# Patient Record
Sex: FEMALE | Race: WHITE | NOT HISPANIC OR LATINO | Employment: OTHER | URBAN - METROPOLITAN AREA
[De-identification: names, ages, dates, MRNs, and addresses within clinical notes are randomized per-mention and may not be internally consistent; named-entity substitution may affect disease eponyms.]

---

## 2017-06-14 DIAGNOSIS — I25.10 CORONARY ARTERY DISEASE INVOLVING NATIVE CORONARY ARTERY OF NATIVE HEART WITHOUT ANGINA PECTORIS: ICD-10-CM

## 2017-06-14 DIAGNOSIS — N28.9 RENAL INSUFFICIENCY: ICD-10-CM

## 2017-06-14 DIAGNOSIS — E66.01 MORBID OBESITY, UNSPECIFIED OBESITY TYPE (H): ICD-10-CM

## 2017-06-14 DIAGNOSIS — I10 ESSENTIAL HYPERTENSION: ICD-10-CM

## 2017-06-14 DIAGNOSIS — G47.33 OSA (OBSTRUCTIVE SLEEP APNEA): ICD-10-CM

## 2017-06-14 RX ORDER — NITROGLYCERIN 0.4 MG/1
0.4 TABLET SUBLINGUAL EVERY 5 MIN PRN
Qty: 25 TABLET | Refills: 1 | Status: SHIPPED | OUTPATIENT
Start: 2017-06-14 | End: 2021-09-14

## 2017-11-26 DIAGNOSIS — E78.2 MIXED HYPERLIPIDEMIA: ICD-10-CM

## 2017-11-26 DIAGNOSIS — I10 ESSENTIAL HYPERTENSION: ICD-10-CM

## 2017-11-26 RX ORDER — CARVEDILOL 25 MG/1
25 TABLET ORAL 2 TIMES DAILY WITH MEALS
Qty: 180 TABLET | Refills: 0 | Status: SHIPPED | OUTPATIENT
Start: 2017-11-26 | End: 2018-02-21

## 2017-11-26 RX ORDER — PRAVASTATIN SODIUM 20 MG
TABLET ORAL
Qty: 90 TABLET | Refills: 0 | Status: SHIPPED | OUTPATIENT
Start: 2017-11-26 | End: 2018-02-21

## 2018-01-16 ENCOUNTER — PRE VISIT (OUTPATIENT)
Dept: CARDIOLOGY | Facility: CLINIC | Age: 83
End: 2018-01-16

## 2018-01-19 ENCOUNTER — OFFICE VISIT (OUTPATIENT)
Dept: CARDIOLOGY | Facility: CLINIC | Age: 83
End: 2018-01-19
Attending: NURSE PRACTITIONER
Payer: COMMERCIAL

## 2018-01-19 VITALS
SYSTOLIC BLOOD PRESSURE: 134 MMHG | HEART RATE: 64 BPM | BODY MASS INDEX: 44.24 KG/M2 | HEIGHT: 66 IN | WEIGHT: 275.3 LBS | DIASTOLIC BLOOD PRESSURE: 78 MMHG

## 2018-01-19 DIAGNOSIS — I25.10 CORONARY ARTERY DISEASE INVOLVING NATIVE CORONARY ARTERY OF NATIVE HEART WITHOUT ANGINA PECTORIS: ICD-10-CM

## 2018-01-19 DIAGNOSIS — R01.1 HEART MURMUR: Primary | ICD-10-CM

## 2018-01-19 DIAGNOSIS — E78.2 MIXED HYPERLIPIDEMIA: ICD-10-CM

## 2018-01-19 DIAGNOSIS — G47.33 OSA (OBSTRUCTIVE SLEEP APNEA): ICD-10-CM

## 2018-01-19 LAB
CHOLEST SERPL-MCNC: 186 MG/DL
HDLC SERPL-MCNC: 41 MG/DL
LDLC SERPL CALC-MCNC: 66 MG/DL
NONHDLC SERPL-MCNC: 145 MG/DL
TRIGL SERPL-MCNC: 397 MG/DL

## 2018-01-19 PROCEDURE — 80061 LIPID PANEL: CPT | Performed by: NURSE PRACTITIONER

## 2018-01-19 PROCEDURE — 99214 OFFICE O/P EST MOD 30 MIN: CPT | Performed by: INTERNAL MEDICINE

## 2018-01-19 PROCEDURE — 36415 COLL VENOUS BLD VENIPUNCTURE: CPT | Performed by: NURSE PRACTITIONER

## 2018-01-19 RX ORDER — FAMOTIDINE 20 MG
TABLET ORAL 2 TIMES DAILY
COMMUNITY
End: 2021-09-14

## 2018-01-19 NOTE — PROGRESS NOTES
REFERRING PHYSICIAN:  Dr. Nga Conde      HISTORY OF PRESENT ILLNESS:  Ms. Barrios is a very pleasant 83-year-old female with a history of nonobstructive coronary artery disease, hypertension, mixed hyperlipidemia, morbid obesity and sleep apnea.  She has other medical conditions including chronic renal insufficiency, neuropathy and gait imbalance as well.  In the last year, she has had, it sounds like some falls related to gait instability.  She describes 1 episode in August where she got up to use the restroom and could not move her left leg.  Her son is with her today and we did talk about this a little bit.  It sounds like she has been having trouble with moving her legs due to her neuropathy.  She had seen physical therapy about a year ago and received some benefit from that in coordinating her leg movements.  It sounds like she may have had some more frequent falling in recent months.  I have suggested that she follow up with her primary care provider on this.  One episode was a little bit concerning about possibility of a stroke because she said she could not move her left leg at all.  Eventually, she was able to pick it up off the ground a little bit, enough to use it and continue to move.  This was back in August, she states.  She has not had any recurrent left arm pain which she has had in the past.  She did have 1 episode of what she felt was heartburn that woke her up at night.  The Zantac did not really seem to help but she went and slept in a recliner and then it seemed to go away.  She is not complaining of any difficulty breathing.  She did have a cholesterol profile today which does show a little bit of deterioration in her numbers.  She is on low dose Pravachol which is helpful in reducing her LDL down to 66 but her triglycerides are actually going up a little bit to 397.  HDL is at 41, total cholesterol 186.  I did review these numbers with her today and her son.  Unfortunately, there is no real good  treatment for a low HDL and her hypertriglyceridemia is likely due to her morbid obesity.      PHYSICAL EXAMINATION:     VITALS SIGNS:  Today, her blood pressure is 134/78, pulse is 64, weight 275.  She is up 6 pounds from last year.  Body mass index is 45.     CARDIOVASCULAR:  Tones sound regular today, suggesting a normal sinus rhythm.  I do auscultate a heart murmur which I have not heard in the past in the right upper sternal border.  I do not appreciate a gallop or rub.   LUNGS:  Clear posteriorly.   EXTREMITIES:  I do not note any significant peripheral edema on exam today.      SUMMARY:  Ms. Barrios is a very pleasant 83-year-old female with a history of nonobstructive coronary disease, hypertension, obesity and mixed hyperlipidemia.  She has treated sleep apnea, renal insufficiency which is stable.  Her main health issues right now stem from her neuropathy, gait imbalance and frequent falling.  Once again, I have advised her to follow up with a primary care provider on this.  There is 1 episode that she describes that I am a little bit concerned she may have had a TIA or stroke-like symptom but she has had chronic gait imbalance and did receive some benefit from physical therapy last year.  This may be helpful to repeat for her.  I would like her to undergo an echocardiogram because of the murmur I auscultated on exam today, specifically to look for aortic valve disease.  If she has aortic stenosis, this may be contributing to her gait imbalance with lightheadedness, etc.  She does not provide a good history of her falls at times.  I will follow up with the results of the echocardiogram once it is complete.  She did not need any medication refills today.  I am happy to see her back as needed.  Please feel free to contact me with any questions you have in regards to her care.      cc:   Nga Conde MD    [address] [address]         SONIA CHAPARRO,               D: 2018 11:08   T: 2018 11:49   MT: RAQUEL      Name:     YASIR ROBLERO   MRN:      [id number]        Account:      [id number]   :      1935           Service Date: 2018      Document: [id number]

## 2018-01-19 NOTE — PROGRESS NOTES
HPI and Plan:   See dictation    Orders Placed This Encounter   Procedures     Echocardiogram       Orders Placed This Encounter   Medications     Cranberry 300 MG TABS     Sig: Take by mouth daily     Vitamin D, Cholecalciferol, 1000 UNITS CAPS     Sig: Take by mouth 2 times daily       There are no discontinued medications.      Encounter Diagnoses   Name Primary?     PHILLIP (obstructive sleep apnea)      Coronary artery disease involving native coronary artery of native heart without angina pectoris      Heart murmur Yes       CURRENT MEDICATIONS:  Current Outpatient Prescriptions   Medication Sig Dispense Refill     Cranberry 300 MG TABS Take by mouth daily       Vitamin D, Cholecalciferol, 1000 UNITS CAPS Take by mouth 2 times daily       carvedilol (COREG) 25 MG tablet Take 1 tablet (25 mg) by mouth 2 times daily (with meals) 180 tablet 0     pravastatin (PRAVACHOL) 20 MG tablet Take 1 tab by mouth daily in the evening 90 tablet 0     nitroglycerin (NITROSTAT) 0.4 MG sublingual tablet Place 1 tablet (0.4 mg) under the tongue every 5 minutes as needed for chest pain 25 tablet 1     valsartan (DIOVAN) 80 MG tablet Take 1 tablet (80 mg) by mouth daily 30 tablet 12     furosemide (LASIX) 40 MG tablet Take 1 tablet (40 mg) by mouth 2 times daily (Patient taking differently: Take 40 mg by mouth Take 80 mg in AM, take 40 mg in PM, take additional 40 mg in PM, as directed) 180 tablet 4     oxybutynin (DITROPAN-XL) 10 MG 24 hr tablet Take by mouth daily       ASPIRIN 81 MG OR TABS 1 TABLET DAILY, takes AFTERNOON       OSCAL 500/200 D-3 OR 1 TAB BID  (does take 2 tabs/day)  Pt was instructed to take at two separate times to get full benefit.       CENTRUM SILVER OR DAILY       B-12 500 MCG OR TABS DAILY         ALLERGIES     Allergies   Allergen Reactions     Neurontin [Gabapentin] Other (See Comments)     hallucinations     Aspirin      In large doses     Cipro [Ciprofloxacin]      Chest pain     Penicillins      rash      Prinivil [Lisinopril]      cough     Sulfa Drugs      shakes       PAST MEDICAL HISTORY:  Past Medical History:   Diagnosis Date     CAD (coronary artery disease)     11/17/2011 Lexiscan Nuclear study - mild anterior/apical ischemia (LAD), 11/2011 CT cor angio - nonobstructive CAD, Calcium score 0     Cancer (H)     Basal cell face     CKD (chronic kidney disease)      Frequent UTI      Hyperlipidemia      Hypertension      Neuropathy     secondary to low back DJD     Obese      PHILLIP (obstructive sleep apnea)      Osteopenia      Pyelonephritis      Thrombocytopenia (H)     ITP       PAST SURGICAL HISTORY:  Past Surgical History:   Procedure Laterality Date     APPENDECTOMY       CATARACT IOL, RT/LT  2005     CHOLECYSTECTOMY       ENT SURGERY      basal cell     ESOPHAGOSCOPY, GASTROSCOPY, DUODENOSCOPY (EGD), COMBINED N/A 10/6/2014    Procedure: COMBINED ESOPHAGOSCOPY, GASTROSCOPY, DUODENOSCOPY (EGD);  Surgeon: Rell Torres MD;  Location:  GI     GYN SURGERY      tubal ligation, hysterectomy     ORTHOPEDIC SURGERY      jaw surgery, spine stenosis - decompression L 3-5 surgery       FAMILY HISTORY:  Family History   Problem Relation Age of Onset     Breast Cancer Daughter      HEART DISEASE Sister      C.A.D. Maternal Grandfather      Cancer - colorectal Brother        SOCIAL HISTORY:  Social History     Social History     Marital status:      Spouse name: N/A     Number of children: N/A     Years of education: N/A     Social History Main Topics     Smoking status: Former Smoker     Quit date: 1/27/1970     Smokeless tobacco: Never Used     Alcohol use Yes      Comment: once per month if that     Drug use: None     Sexual activity: No     Other Topics Concern     Parent/Sibling W/ Cabg, Mi Or Angioplasty Before 65f 55m? No     Caffeine Concern No     Sleep Concern Yes     sleep apnea, wears cpap at night     Stress Concern No     Weight Concern No     Special Diet Yes     low sodium     Exercise No      "Seat Belt Yes     Social History Narrative    She lives in a condo currently in [place]               Review of Systems:  Skin:  Negative       Eyes:  Positive for glasses macular degeneration; reading glasses  ENT:  Positive for hearing loss;sinus trouble    Respiratory:  Positive for sleep apnea;CPAP;dyspnea on exertion;cough dry cough   Cardiovascular:    Positive for;lightheadedness    Gastroenterology: Positive for reflux;heartburn    Genitourinary:  Positive for nocturia    Musculoskeletal:  Positive for neck pain;back pain;arthritis;joint pain;nocturnal cramping knees  Neurologic:  Positive for numbness or tingling of hands    Psychiatric:  Negative      Heme/Lymph/Imm:  Negative      Endocrine:  Negative        Physical Exam:  Vitals: /78 (BP Location: Right arm, Patient Position: Chair, Cuff Size: Adult Large)  Pulse 64  Ht 1.664 m (5' 5.5\")  Wt 124.9 kg (275 lb 4.8 oz)  BMI 45.12 kg/m2    Constitutional:  cooperative, alert and oriented, well developed, well nourished, in no acute distress        Skin:  warm and dry to the touch          Head:  normocephalic        Eyes:  pupils equal and round        Lymph:      ENT:  no pallor or cyanosis, dentition good        Neck:  no carotid bruit delayed carotid upstroke      Respiratory:  clear to auscultation;normal symmetry         Cardiac: regular rhythm       systolic ejection murmur;RUSB          pulses below the femoral arteries are diminished                                      GI:  abdomen soft obese      Extremities and Muscular Skeletal:  no deformities, clubbing, cyanosis, erythema observed;no edema              Neurological:  affect appropriate   gait imbalance    Psych:  Alert and Oriented x 3          CC  HARPREET More CNP  [id number]  [place] [id number]                  "

## 2018-01-19 NOTE — MR AVS SNAPSHOT
After Visit Summary   1/19/2018    Karli Barrios    MRN: [id number]           Patient Information     Date Of Birth          1935        Visit Information        Provider Department      1/19/2018 10:45 AM Ann Knapp DO [facility]        Today's Diagnoses     Heart murmur    -  1    PHILLIP (obstructive sleep apnea)        Coronary artery disease involving native coronary artery of native heart without angina pectoris           Follow-ups after your visit        Your next 10 appointments already scheduled     Feb 01, 2018  1:00 PM CST   Ech Complete with [id number] ([facility])    [address]  [facility] [address] [phone]           1. Please bring or wear a comfortable two-piece outfit. 2. You may eat, drink and take your normal medicines. 3. For any questions that cannot be answered, please contact the ordering physician ***Please check-in at the [place] Registration Office located in [address] in the [facility] building. When you are finished registering, please go to [address] and have a seat. The technician will call your name for the test.              Future tests that were ordered for you today     Open Future Orders        Priority Expected Expires Ordered    Echocardiogram Routine 1/26/2018 1/19/2019 1/19/2018            Who to contact     If you have questions or need follow up information about today's clinic visit or your schedule please contact [facility] directly at [phone].  Normal or non-critical lab and imaging results will be communicated to you by MyChart, letter or phone within 4 business days after the clinic has received the results. If you do not hear from us within 7 days, please contact the clinic through MyChart or phone. If you have a critical or abnormal lab result, we  "will notify you by phone as soon as possible.  Submit refill requests through [employer] or call your pharmacy and they will forward the refill request to us. Please allow 3 business days for your refill to be completed.          Additional Information About Your Visit        [employer] Information     [employer] lets you send messages to your doctor, view your test results, renew your prescriptions, schedule appointments and more. To sign up, go to [email or web address] . Click on \"Log in\" on the left side of the screen, which will take you to the Welcome page. Then click on \"Sign up Now\" on the right side of the page.     You will be asked to enter the access code listed below, as well as some personal information. Please follow the directions to create your username and password.     Your access code is: [email or web address]  Expires: 2018 11:10 AM     Your access code will  in 90 days. If you need help or a new code, please call your [facility] clinic or [phone].        Care EveryWhere ID     This is your Care EveryWhere ID. This could be used by other organizations to access your [facility] medical records  [id number]        Your Vitals Were     Pulse Height BMI (Body Mass Index)             64 1.664 m (5' 5.5\") 45.12 kg/m2          Blood Pressure from Last 3 Encounters:   18 134/78   16 144/68   11/25/15 146/74    Weight from Last 3 Encounters:   18 124.9 kg (275 lb 4.8 oz)   16 122 kg (269 lb)   11/25/15 119.5 kg (263 lb 6.4 oz)              We Performed the Following     Follow-Up with Cardiologist          Today's Medication Changes          These changes are accurate as of: 18 11:11 AM.  If you have any questions, ask your nurse or doctor.               These medicines have changed or have updated prescriptions.        Dose/Directions    furosemide 40 MG tablet   Commonly known as:  LASIX   This may have changed:    - when to take this  - additional instructions   Used for:  " Coronary atherosclerosis of unspecified type of vessel, native or graft        Dose:  40 mg   Take 1 tablet (40 mg) by mouth 2 times daily   Quantity:  180 tablet   Refills:  4                Primary Care Provider Office Phone # Fax #    Nga Conde [phone]       [facility] [address]  [facility] [address]        Equal Access to Services     [facility]: Hadii aad ku hadasho Soomaali, waaxda luqadaha, qaybta kaalmada adeegyada, waxay idiin hayaan adeeg kharash la'aan . So [facility] [phone].    ATENCIÓN: Si habla español, tiene a garcia disposición servicios gratuitos de asistencia lingüística. Kya al [phone].    We comply with applicable federal civil rights laws and [place] laws. We do not discriminate on the basis of race, color, national origin, age, disability, sex, sexual orientation, or gender identity.            Thank you!     Thank you for choosing [facility]  for your care. Our goal is always to provide you with excellent care. Hearing back from our patients is one way we can continue to improve our services. Please take a few minutes to complete the written survey that you may receive in the mail after your visit with us. Thank you!             Your Updated Medication List - Protect others around you: Learn how to safely use, store and throw away your medicines at www.disposemymeds.org.          This list is accurate as of: 1/19/18 11:11 AM.  Always use your most recent med list.                   Brand Name Dispense Instructions for use Diagnosis    aspirin 81 MG tablet      1 TABLET DAILY, takes AFTERNOON        B-12 500 MCG Tabs      DAILY        carvedilol 25 MG tablet    COREG    180 tablet    Take 1 tablet (25 mg) by mouth 2 times daily (with meals)    Essential hypertension       CENTRUM SILVER PO      DAILY        Cranberry 300 MG Tabs      Take by mouth daily        furosemide 40 MG tablet    LASIX     180 tablet    Take 1 tablet (40 mg) by mouth 2 times daily    Coronary atherosclerosis of unspecified type of vessel, native or graft       nitroGLYcerin 0.4 MG sublingual tablet    NITROSTAT    25 tablet    Place 1 tablet (0.4 mg) under the tongue every 5 minutes as needed for chest pain    PHILLIP (obstructive sleep apnea), Coronary artery disease involving native coronary artery of native heart without angina pectoris, Essential hypertension, Renal insufficiency, Morbid obesity, unspecified obesity type (H)       OSCAL 500/200 D-3 PO      1 TAB BID  (does take 2 tabs/day)  Pt was instructed to take at two separate times to get full benefit.        oxybutynin 10 MG 24 hr tablet    DITROPAN-XL     Take by mouth daily        pravastatin 20 MG tablet    PRAVACHOL    90 tablet    Take 1 tab by mouth daily in the evening    Mixed hyperlipidemia       valsartan 80 MG tablet    DIOVAN    30 tablet    Take 1 tablet (80 mg) by mouth daily    PHILLIP (obstructive sleep apnea), Coronary artery disease involving native coronary artery of native heart without angina pectoris, Essential hypertension, Renal insufficiency, Morbid obesity, unspecified obesity type (H)       Vitamin D (Cholecalciferol) 1000 UNITS Caps      Take by mouth 2 times daily

## 2018-01-19 NOTE — LETTER
1/19/2018      Nga Conde MD, MD  [facility] [address]  [facility] [address]      RE: Karli Barrios       Dear Colleague,    I had the pleasure of seeing Karli Barrios in the [facility].    REFERRING PHYSICIAN:  Dr. Nga Conde      HISTORY OF PRESENT ILLNESS:  Ms. Barrios is a very pleasant 83-year-old female with a history of nonobstructive coronary artery disease, hypertension, mixed hyperlipidemia, morbid obesity and sleep apnea.  She has other medical conditions including chronic renal insufficiency, neuropathy and gait imbalance as well.  In the last year, she has had, it sounds like some falls related to gait instability.  She describes 1 episode in August where she got up to use the restroom and could not move her left leg.  Her son is with her today and we did talk about this a little bit.  It sounds like she has been having trouble with moving her legs due to her neuropathy.  She had seen physical therapy about a year ago and received some benefit from that in coordinating her leg movements.  It sounds like she may have had some more frequent falling in recent months.  I have suggested that she follow up with her primary care provider on this.  One episode was a little bit concerning about possibility of a stroke because she said she could not move her left leg at all.  Eventually, she was able to pick it up off the ground a little bit, enough to use it and continue to move.  This was back in August, she states.  She has not had any recurrent left arm pain which she has had in the past.  She did have 1 episode of what she felt was heartburn that woke her up at night.  The Zantac did not really seem to help but she went and slept in a recliner and then it seemed to go away.  She is not complaining of any difficulty breathing.  She did have a cholesterol profile today which does show a little bit of deterioration in her numbers.  She is on low dose  Pravachol which is helpful in reducing her LDL down to 66 but her triglycerides are actually going up a little bit to 397.  HDL is at 41, total cholesterol 186.  I did review these numbers with her today and her son.  Unfortunately, there is no real good treatment for a low HDL and her hypertriglyceridemia is likely due to her morbid obesity.      PHYSICAL EXAMINATION:     VITALS SIGNS:  Today, her blood pressure is 134/78, pulse is 64, weight 275.  She is up 6 pounds from last year.  Body mass index is 45.     CARDIOVASCULAR:  Tones sound regular today, suggesting a normal sinus rhythm.  I do auscultate a heart murmur which I have not heard in the past in the right upper sternal border.  I do not appreciate a gallop or rub.   LUNGS:  Clear posteriorly.   EXTREMITIES:  I do not note any significant peripheral edema on exam today.      SUMMARY:  Ms. Barrios is a very pleasant 83-year-old female with a history of nonobstructive coronary disease, hypertension, obesity and mixed hyperlipidemia.  She has treated sleep apnea, renal insufficiency which is stable.  Her main health issues right now stem from her neuropathy, gait imbalance and frequent falling.  Once again, I have advised her to follow up with a primary care provider on this.  There is 1 episode that she describes that I am a little bit concerned she may have had a TIA or stroke-like symptom but she has had chronic gait imbalance and did receive some benefit from physical therapy last year.  This may be helpful to repeat for her.  I would like her to undergo an echocardiogram because of the murmur I auscultated on exam today, specifically to look for aortic valve disease.  If she has aortic stenosis, this may be contributing to her gait imbalance with lightheadedness, etc.  She does not provide a good history of her falls at times.  I will follow up with the results of the echocardiogram once it is complete.  She did not need any medication refills today.  I am  happy to see her back as needed.  Please feel free to contact me with any questions you have in regards to her care.      cc:   Nga [place] [address]         ANN KNAPP DO             D: 2018 11:08   T: 2018 11:49   MT: DW      Name:     YASIR ROBLERO   MRN:      [id number]        Account:      [id number]   :      1935           Service Date: 2018      Document: [id number]         Outpatient Encounter Prescriptions as of 2018   Medication Sig Dispense Refill     Cranberry 300 MG TABS Take by mouth daily       Vitamin D, Cholecalciferol, 1000 UNITS CAPS Take by mouth 2 times daily       carvedilol (COREG) 25 MG tablet Take 1 tablet (25 mg) by mouth 2 times daily (with meals) 180 tablet 0     pravastatin (PRAVACHOL) 20 MG tablet Take 1 tab by mouth daily in the evening 90 tablet 0     nitroglycerin (NITROSTAT) 0.4 MG sublingual tablet Place 1 tablet (0.4 mg) under the tongue every 5 minutes as needed for chest pain 25 tablet 1     valsartan (DIOVAN) 80 MG tablet Take 1 tablet (80 mg) by mouth daily 30 tablet 12     furosemide (LASIX) 40 MG tablet Take 1 tablet (40 mg) by mouth 2 times daily (Patient taking differently: Take 40 mg by mouth Take 80 mg in AM, take 40 mg in PM, take additional 40 mg in PM, as directed) 180 tablet 4     oxybutynin (DITROPAN-XL) 10 MG 24 hr tablet Take by mouth daily       ASPIRIN 81 MG OR TABS 1 TABLET DAILY, takes AFTERNOON       OSCAL 500/200 D-3 OR 1 TAB BID  (does take 2 tabs/day)  Pt was instructed to take at two separate times to get full benefit.       CENTRUM SILVER OR DAILY       B-12 500 MCG OR TABS DAILY       No facility-administered encounter medications on file as of 2018.        Again, thank you for allowing me to participate in the care of your patient.      Sincerely,    Ann Knapp DO     [facility]

## 2018-02-01 ENCOUNTER — TELEPHONE (OUTPATIENT)
Dept: CARDIOLOGY | Facility: CLINIC | Age: 83
End: 2018-02-01

## 2018-02-01 ENCOUNTER — HOSPITAL ENCOUNTER (OUTPATIENT)
Dept: CARDIOLOGY | Facility: CLINIC | Age: 83
Discharge: HOME | End: 2018-02-01
Attending: INTERNAL MEDICINE | Admitting: INTERNAL MEDICINE
Payer: MEDICARE

## 2018-02-01 DIAGNOSIS — R01.1 HEART MURMUR: ICD-10-CM

## 2018-02-01 DIAGNOSIS — G47.33 OSA (OBSTRUCTIVE SLEEP APNEA): ICD-10-CM

## 2018-02-01 DIAGNOSIS — I25.10 CORONARY ARTERY DISEASE INVOLVING NATIVE CORONARY ARTERY OF NATIVE HEART WITHOUT ANGINA PECTORIS: ICD-10-CM

## 2018-02-01 PROCEDURE — 93306 TTE W/DOPPLER COMPLETE: CPT | Mod: 26 | Performed by: INTERNAL MEDICINE

## 2018-02-01 PROCEDURE — 93306 TTE W/DOPPLER COMPLETE: CPT

## 2018-02-01 NOTE — TELEPHONE ENCOUNTER
Called and spoke to patient to review echo results. Dr. Knapp ordered echo to eval for murmur, possible mild to moderate p HTN noted. Briefly reviewed results over the phone with the patient. Pt states is using CPAP regularly. Will route to Dr. Knapp for further recommendations.     Interpretation Summary     The visual ejection fraction is estimated at 60-65%.  Left ventricular systolic function is normal.  Right ventricular systolic pressure is elevated, consistent with mild to  moderate pulmonary hypertension.

## 2018-02-21 DIAGNOSIS — E78.2 MIXED HYPERLIPIDEMIA: ICD-10-CM

## 2018-02-21 DIAGNOSIS — I10 ESSENTIAL HYPERTENSION: ICD-10-CM

## 2018-02-21 RX ORDER — CARVEDILOL 25 MG/1
25 TABLET ORAL 2 TIMES DAILY WITH MEALS
Qty: 180 TABLET | Refills: 3 | Status: SHIPPED | OUTPATIENT
Start: 2018-02-21 | End: 2021-08-12 | Stop reason: ALTCHOICE

## 2018-02-21 RX ORDER — PRAVASTATIN SODIUM 20 MG
TABLET ORAL
Qty: 90 TABLET | Refills: 3 | Status: SHIPPED | OUTPATIENT
Start: 2018-02-21 | End: 2018-11-21

## 2018-09-15 ENCOUNTER — NURSE TRIAGE (OUTPATIENT)
Dept: NURSING | Facility: CLINIC | Age: 83
End: 2018-09-15

## 2018-09-16 NOTE — TELEPHONE ENCOUNTER
Reason for Disposition    Sounds like a life-threatening emergency to the triager    Additional Information    Negative: Difficult to awaken or acting confused  (e.g., disoriented, slurred speech)    Negative: [1] Weakness of the face, arm or leg on one side of the body AND [2] new onset    Negative: [1] Numbness of the face, arm or leg on one side of the body AND [2] new onset    Negative: [1] Loss of speech or garbled speech AND [2] new onset    Negative: Passed out (i.e., lost consciousness, collapsed and was not responding)    Protocols used: HEADACHE-ADULT-    Karli calls and says that she came home from [facility], yesterday. Pt. Says that her head is very noisy and hears her heartbeat, in her head. Pt. Says that when this happens, it means that her blood pressure is high. Pt. Says that she had shoulder pain and took 2 NTG., which helped the pain. Denies any chest pain. Pt. Says that she is home alone. Pt. Will call 911.

## 2018-09-20 ENCOUNTER — TRANSFERRED RECORDS (OUTPATIENT)
Dept: HEALTH INFORMATION MANAGEMENT | Facility: CLINIC | Age: 83
End: 2018-09-20

## 2018-11-21 DIAGNOSIS — E78.2 MIXED HYPERLIPIDEMIA: ICD-10-CM

## 2018-11-21 RX ORDER — PRAVASTATIN SODIUM 20 MG
TABLET ORAL
Qty: 90 TABLET | Refills: 1 | Status: SHIPPED | OUTPATIENT
Start: 2018-11-21 | End: 2023-01-01

## 2019-09-17 ENCOUNTER — TRANSFERRED RECORDS (OUTPATIENT)
Dept: PHYSICAL THERAPY | Facility: CLINIC | Age: 84
End: 2019-09-17

## 2019-09-23 ENCOUNTER — TRANSFERRED RECORDS (OUTPATIENT)
Dept: HEALTH INFORMATION MANAGEMENT | Facility: CLINIC | Age: 84
End: 2019-09-23

## 2019-12-02 ENCOUNTER — THERAPY VISIT (OUTPATIENT)
Dept: PHYSICAL THERAPY | Facility: CLINIC | Age: 84
End: 2019-12-02
Payer: MEDICARE

## 2019-12-02 DIAGNOSIS — N39.46 MIXED INCONTINENCE: Primary | ICD-10-CM

## 2019-12-02 PROCEDURE — 97163 PT EVAL HIGH COMPLEX 45 MIN: CPT | Mod: GP | Performed by: PHYSICAL THERAPIST

## 2019-12-02 PROCEDURE — 97535 SELF CARE MNGMENT TRAINING: CPT | Mod: GP | Performed by: PHYSICAL THERAPIST

## 2019-12-02 PROCEDURE — 97110 THERAPEUTIC EXERCISES: CPT | Mod: GP | Performed by: PHYSICAL THERAPIST

## 2019-12-02 NOTE — PROGRESS NOTES
"[facility] Initial Evaluation  Subjective:  Patient referred to PT on 09/18/19 by Dr. Roma Rodriguez. Previously seen in PT in 2014 for pelvic floor issues as well. Chief c/o: leakage when bends over to pull up pants after voiding, transitional movements. Feels incomplete emptying. Also with cough/sneeze. Started on oxybuytinin which has helped with not having major leakage accidents but will still occur occasionally and will \"flood with walking\". ~2x week. Up 2 x night. Uses ~ 2-3 Poise pads/day. On high dose of Lasix so in morning voids every 1.5 hours, afternoon not as often and less amount. Goal is to empty all the way and void less often, not leakage with bending over.   History of UTI/kidney infections, complete hysterectomy in her 40's with \"bladder lift\", 7 vaginal deliveries (6-8# with all babies, 1 was a lot of tearing), HBP, B LE peripheral neuropathy (difficulty walking so uses walker all the time, 2 falls this year), lumbar surgery ~3 years ago.  Lives in [facility], , does own cooking but has help with cleaning/bathing.     The history is provided by the patient. No  was used.                       Objective:    Gait:  Slowed gait with wheeled walker (due to severe neuropathy). Needed min A +2 to get onto plinth, SBA/CGA with sit to stand/help with getting pants on/off.   Gait Type:  Antalgic   Assistive Devices:  Walker                                         Pelvic Dysfunction Evaluation:    Bladder/Pelvic Problems:    Storage Problem:  Mixed incontinence  Emptying Problem:  Incomplete emptying      Diagnostic Tests:    Pelvic Exam:  Yes  UA/Urine Sample:  Yes  Post Void Residual:  Yes      Cystoscopy:  Not sure            Flexibility:    Tightness present at:Adductors      Pelvic Clock Exam:    Ischiocavernosis pain:  -  Bulbocavernosis pain:  +  Transverse Perineal:  -  Levator ANI:  ++      Reflex Testing:  normal    External Assessment:  "           Introitus:  Normal  Muscle Contraction/Perineal Mobility:  Slight lift, no urogential triangle descent  Internal Assessment:      Contraction/Grade:  Weak squeeze, 2 second hold (2)          SEMG Biofeedback:  NA (deferred due to neuropathy)                  Additional History:  Delivery History:  Vaginal delivery and tearing  Number of Pregnancies: 7  Number of Live Births: 7  Caffeine Consumption:  2/day                     General     ROS    Assessment/Plan:    Patient is a 84 year old female with pelvic complaints.    Patient has the following significant findings with corresponding treatment plan.                Diagnosis 1:  Mixed incontinence  Pain -  manual therapy, self management, education, directional preference exercise and home program  Decreased ROM/flexibility - manual therapy and therapeutic exercise  Decreased strength - therapeutic exercise and therapeutic activities  Decreased proprioception - neuro re-education and therapeutic activities  Inflammation - self management/home program  Impaired gait - gait training  Impaired muscle performance - biofeedback and neuro re-education  Decreased function - therapeutic activities  Impaired posture - neuro re-education    Therapy Evaluation Codes:   1) History comprised of:   Personal factors that impact the plan of care:      Time since onset of symptoms.    Comorbidity factors that impact the plan of care are:      High blood pressure, Menopausal, Overweight and neuropathy.     Medications impacting care: High blood pressure and bladder medication.  2) Examination of Body Systems comprised of:   Body structures and functions that impact the plan of care:      Pelvis.   Activity limitations that impact the plan of care are:      Bending, Frequency, Stress incontinence, Urgency and Urge incontinence.  3) Clinical presentation characteristics are:   Unstable/Unpredictable.  4) Decision-Making    High complexity using standardized patient  assessment instrument and/or measureable assessment of functional outcome.  Cumulative Therapy Evaluation is: High complexity.    Previous and current functional limitations:  (See Goal Flow Sheet for this information)    Short term and Long term goals: (See Goal Flow Sheet for this information)     Communication ability:  Patient appears to be able to clearly communicate and understand verbal and written communication and follow directions correctly.  Treatment Explanation - The following has been discussed with the patient:   RX ordered/plan of care  Anticipated outcomes  Possible risks and side effects  This patient would benefit from PT intervention to resume normal activities.   Rehab potential is questionable.    Frequency:  2 X a month, once daily  Duration:  for 3 months  Discharge Plan:  Achieve all LTG.  Independent in home treatment program.  Reach maximal therapeutic benefit.    Please refer to the daily flowsheet for treatment today, total treatment time and time spent performing 1:1 timed codes.

## 2019-12-02 NOTE — LETTER
"DEPARTMENT OF HEALTH AND HUMAN SERVICES  CENTERS FOR MEDICARE & MEDICAID SERVICES    PLAN/UPDATED PLAN OF PROGRESS FOR OUTPATIENT REHABILITATION    PATIENTS NAME:  Karli Barrios   : 1935  PROVIDER NUMBER:    [id number]  HICN:[id number]  PROVIDER NAME: [facility]  MEDICAL RECORD NUMBER: [id number]   START OF CARE DATE:  SOC Date: 19   TYPE:  PT  PRIMARY/TREATMENT DIAGNOSIS: (Pertinent Medical Diagnosis)  Mixed incontinence    VISITS FROM START OF CARE:  Rxs Used: 1     [facility] Initial Evaluation  Subjective:  Patient referred to PT on 19 by Dr. Roma Rodriguez. Previously seen in PT in 2014 for pelvic floor issues as well. Chief c/o: leakage when bends over to pull up pants after voiding, transitional movements. Feels incomplete emptying. Also with cough/sneeze. Started on oxybuytinin which has helped with not having major leakage accidents but will still occur occasionally and will \"flood with walking\". ~2x week. Up 2 x night. Uses ~ 2-3 Poise pads/day. On high dose of Lasix so in morning voids every 1.5 hours, afternoon not as often and less amount. Goal is to empty all the way and void less often, not leakage with bending over.   History of UTI/kidney infections, complete hysterectomy in her 40's with \"bladder lift\", 7 vaginal deliveries (6-8# with all babies, 1 was a lot of tearing), HBP, B LE peripheral neuropathy (difficulty walking so uses walker all the time, 2 falls this year), lumbar surgery ~3 years ago.  Lives in [facility], , does own cooking but has help with cleaning/bathing.     The history is provided by the patient. No  was used.                 Objective:  Gait:  Slowed gait with wheeled walker (due to severe neuropathy). Needed min A +2 to get onto plinth, SBA/CGA with sit to stand/help with getting pants on/off.   Gait Type:  Antalgic   Assistive Devices:  Walker    Pelvic Dysfunction Evaluation:    Bladder/Pelvic Problems:  "   Storage Problem:  Mixed incontinence  Emptying Problem:  Incomplete emptying      Diagnostic Tests:    Pelvic Exam:  Yes  UA/Urine Sample:  Yes  Post Void Residual:  Yes  Cystoscopy:  Not sure      Flexibility:    Tightness present at:Adductors    Pelvic Clock Exam:    Ischiocavernosis pain:  -  Bulbocavernosis pain:  +  Transverse Perineal:  -  Levator ANI:  ++  PATIENTS NAME:  Karli Barrios   : 1935    Reflex Testing:  normal    External Assessment:    Introitus:  Normal  Muscle Contraction/Perineal Mobility:  Slight lift, no urogential triangle descent    Internal Assessment:    Contraction/Grade:  Weak squeeze, 2 second hold (2)    SEMG Biofeedback:  NA (deferred due to neuropathy)    Additional History:  Delivery History:  Vaginal delivery and tearing  Number of Pregnancies: 7  Number of Live Births: 7  Caffeine Consumption:  2/day       Assessment/Plan:    Patient is a 84 year old female with pelvic complaints.    Patient has the following significant findings with corresponding treatment plan.                Diagnosis 1:  Mixed incontinence  Pain -  manual therapy, self management, education, directional preference exercise and home program  Decreased ROM/flexibility - manual therapy and therapeutic exercise  Decreased strength - therapeutic exercise and therapeutic activities  Decreased proprioception - neuro re-education and therapeutic activities  Inflammation - self management/home program  Impaired gait - gait training  Impaired muscle performance - biofeedback and neuro re-education  Decreased function - therapeutic activities  Impaired posture - neuro re-education    Therapy Evaluation Codes:   1) History comprised of:   Personal factors that impact the plan of care:      Time since onset of symptoms.    Comorbidity factors that impact the plan of care are:      High blood pressure, Menopausal, Overweight and neuropathy.     Medications impacting care: High blood pressure and bladder  "medication.  2) Examination of Body Systems comprised of:   Body structures and functions that impact the plan of care:      Pelvis.   Activity limitations that impact the plan of care are:      Bending, Frequency, Stress incontinence, Urgency and Urge incontinence.  3) Clinical presentation characteristics are:   Unstable/Unpredictable.  4) Decision-Making    High complexity using standardized patient assessment instrument and/or measureable assessment of functional outcome.  Cumulative Therapy Evaluation is: High complexity.  Previous and current functional limitations:  (See Goal Flow Sheet for this information)    Short term and Long term goals: (See Goal Flow Sheet for this information)     Communication ability:  Patient appears to be able to clearly communicate and understand verbal and written communication and follow directions correctly.  Treatment Explanation - The following has been discussed with the patient:   RX ordered/plan of care  PATIENTS NAME:  Karli Barrios   : 1935    Anticipated outcomes  Possible risks and side effects  This patient would benefit from PT intervention to resume normal activities.   Rehab potential is questionable.    Frequency:  2 X a month, once daily  Duration:  for 3 months  Discharge Plan:  Achieve all LTG.  Independent in home treatment program.  Reach maximal therapeutic benefit.      Caregiver Signature/Credentials _____________________________ Date ________       Treating Provider: Huong Luna, PT, OCS   I have reviewed and certified the need for these services and plan of treatment while under my care.        PHYSICIAN'S SIGNATURE:   _____________________________________  Date___________     Roma Rodriguez MD    Certification period:  Beginning of Cert date period: 19 to  End of Cert period date: 20     Functional Level Progress Report: Please see attached \"Goal Flow sheet for Functional level.\"    ____X____ Continue Services or       " ________ DC Services                Service dates: From  SOC Date: 12/02/19 date to present

## 2020-01-28 ENCOUNTER — THERAPY VISIT (OUTPATIENT)
Dept: PHYSICAL THERAPY | Facility: CLINIC | Age: 85
End: 2020-01-28
Payer: MEDICARE

## 2020-01-28 DIAGNOSIS — N39.46 MIXED INCONTINENCE: ICD-10-CM

## 2020-01-28 PROCEDURE — 97110 THERAPEUTIC EXERCISES: CPT | Mod: GP | Performed by: PHYSICAL THERAPIST

## 2020-01-28 PROCEDURE — 97535 SELF CARE MNGMENT TRAINING: CPT | Mod: GP | Performed by: PHYSICAL THERAPIST

## 2020-02-11 ENCOUNTER — THERAPY VISIT (OUTPATIENT)
Dept: PHYSICAL THERAPY | Facility: CLINIC | Age: 85
End: 2020-02-11
Payer: MEDICARE

## 2020-02-11 DIAGNOSIS — N39.46 MIXED INCONTINENCE: ICD-10-CM

## 2020-02-11 PROCEDURE — 97535 SELF CARE MNGMENT TRAINING: CPT | Mod: GP | Performed by: PHYSICAL THERAPIST

## 2020-02-11 PROCEDURE — 97110 THERAPEUTIC EXERCISES: CPT | Mod: GP | Performed by: PHYSICAL THERAPIST

## 2020-02-18 ENCOUNTER — THERAPY VISIT (OUTPATIENT)
Dept: PHYSICAL THERAPY | Facility: CLINIC | Age: 85
End: 2020-02-18
Payer: MEDICARE

## 2020-02-18 DIAGNOSIS — N39.46 MIXED INCONTINENCE: ICD-10-CM

## 2020-02-18 PROCEDURE — 97110 THERAPEUTIC EXERCISES: CPT | Mod: GP | Performed by: PHYSICAL THERAPIST

## 2020-02-18 PROCEDURE — 97535 SELF CARE MNGMENT TRAINING: CPT | Mod: GP | Performed by: PHYSICAL THERAPIST

## 2021-02-22 NOTE — PROGRESS NOTES
Subjective:  HPI  Physical Exam                    Objective:  System    Physical Exam    General     ROS    Assessment/Plan:    DISCHARGE REPORT    Progress reporting period is from 02/18/20.       SUBJECTIVE  Subjective changes noted by patient:   Very difficult for patient to get here due to leg pain/walking into clinic/etc. Reports no gushers or major leakage. Only had to change pad 1-2/day. Leakage occurs only if moving. No leakage with getting up from sitting.     Current pain level is NA  .     Previous pain level was  NA  .   Changes in function:  Yes (See Goal flowsheet attached for changes in current functional level)  Adverse reaction to treatment or activity: None    OBJECTIVE  Changes noted in objective findings:  Patient has failed to return to therapy so current objective findings are unknown.  Objective: Recommend f/u in 1 month as patient is improving and can work on HEP. Difficulty getting TB around knees for roll out's.      ASSESSMENT/PLAN  Updated problem list and treatment plan:   STG/LTGs have been met or progress has been made towards goals:  Yes (See Goal flow sheet completed today.)  Assessment of Progress: The patient has not returned to therapy. Current status is unknown.  Self Management Plans:  Patient has been instructed in a home treatment program.  Patient  has been instructed in self management of symptoms.    Karli continues to require the following intervention to meet STG and LTG's:  PT intervention is no longer required to meet STG/LTG.    Recommendations:  This patient is ready to be discharged from therapy and continue their home treatment program.    Please refer to the daily flowsheet for treatment today, total treatment time and time spent performing 1:1 timed codes.

## 2021-06-03 ENCOUNTER — OFFICE VISIT (OUTPATIENT)
Dept: CARDIOLOGY | Facility: CLINIC | Age: 86
End: 2021-06-03
Payer: COMMERCIAL

## 2021-06-03 VITALS
OXYGEN SATURATION: 94 % | SYSTOLIC BLOOD PRESSURE: 150 MMHG | DIASTOLIC BLOOD PRESSURE: 76 MMHG | HEIGHT: 64 IN | HEART RATE: 66 BPM | BODY MASS INDEX: 34.15 KG/M2 | WEIGHT: 200 LBS

## 2021-06-03 DIAGNOSIS — R00.2 PALPITATIONS: ICD-10-CM

## 2021-06-03 DIAGNOSIS — I25.10 CORONARY ARTERY DISEASE INVOLVING NATIVE CORONARY ARTERY OF NATIVE HEART WITHOUT ANGINA PECTORIS: ICD-10-CM

## 2021-06-03 DIAGNOSIS — R01.1 HEART MURMUR: Primary | ICD-10-CM

## 2021-06-03 DIAGNOSIS — E66.01 MORBID OBESITY (H): ICD-10-CM

## 2021-06-03 DIAGNOSIS — I10 ESSENTIAL HYPERTENSION: ICD-10-CM

## 2021-06-03 DIAGNOSIS — R00.0 TACHYCARDIA: ICD-10-CM

## 2021-06-03 PROCEDURE — 93000 ELECTROCARDIOGRAM COMPLETE: CPT | Performed by: INTERNAL MEDICINE

## 2021-06-03 PROCEDURE — 99204 OFFICE O/P NEW MOD 45 MIN: CPT | Performed by: INTERNAL MEDICINE

## 2021-06-03 RX ORDER — HYDRALAZINE HYDROCHLORIDE 25 MG/1
TABLET, FILM COATED ORAL
COMMUNITY
Start: 2021-05-09 | End: 2021-09-14

## 2021-06-03 ASSESSMENT — MIFFLIN-ST. JEOR: SCORE: 1342.19

## 2021-06-03 NOTE — LETTER
6/3/2021    Nga Conde MD, MD  [facility] [address]  [facility] [address]    RE: Karli Barrios       Dear Colleague,    I had the pleasure of seeing Karli Barrios in the [facility].    HPI and Plan:   See dictation    Orders Placed This Encounter   Procedures     Follow-Up with Cardiac Advanced Practice Provider     EKG 12-lead complete w/read - Clinics (performed today)     Leadless EKG Monitor 8 to 14 Days       Orders Placed This Encounter   Medications     hydrALAZINE (APRESOLINE) 25 MG tablet     Sig: TAKE ONE TABLET BY MOUTH FOUR TIMES DAILY       Medications Discontinued During This Encounter   Medication Reason     OSCAL 500/200 D-3 OR Stopped by Patient     oxybutynin (DITROPAN-XL) 10 MG 24 hr tablet Stopped by Patient     valsartan (DIOVAN) 80 MG tablet Stopped by Patient         Encounter Diagnoses   Name Primary?     Heart murmur Yes     Tachycardia      Palpitations      Essential hypertension      Coronary artery disease involving native coronary artery of native heart without angina pectoris      Morbid obesity (H)        CURRENT MEDICATIONS:  Current Outpatient Medications   Medication Sig Dispense Refill     ASPIRIN 81 MG OR TABS 1 TABLET DAILY, takes AFTERNOON       B-12 500 MCG OR TABS DAILY       carvedilol (COREG) 25 MG tablet Take 1 tablet (25 mg) by mouth 2 times daily (with meals) 180 tablet 3     CENTRUM SILVER OR DAILY       Cranberry 300 MG TABS Take by mouth daily       furosemide (LASIX) 40 MG tablet Take 1 tablet (40 mg) by mouth 2 times daily (Patient taking differently: Take 40 mg by mouth Take 80 mg in AM, take 40 mg in PM, take additional 40 mg in PM, as directed) 180 tablet 4     hydrALAZINE (APRESOLINE) 25 MG tablet TAKE ONE TABLET BY MOUTH FOUR TIMES DAILY       pravastatin (PRAVACHOL) 20 MG tablet Take 1 tab by mouth daily in the evening 90 tablet 1     Vitamin D, Cholecalciferol, 1000  "UNITS CAPS Take by mouth 2 times daily       nitroglycerin (NITROSTAT) 0.4 MG sublingual tablet Place 1 tablet (0.4 mg) under the tongue every 5 minutes as needed for chest pain (Patient not taking: Reported on 6/3/2021) 25 tablet 1       ALLERGIES     Allergies   Allergen Reactions     Neurontin [Gabapentin] Other (See Comments)     hallucinations     Aspirin      In large doses     Azithromycin Other (See Comments)     Chest pain     Cipro [Ciprofloxacin]      Chest pain     Irbesartan Unknown     Morphine Visual Disturbance     Pt. Stated \"i do not like  Morphine. It makes me feel goofy.\"     Pcn [Penicillins]      rash     Prinivil [Lisinopril]      cough     Sulfa Drugs Other (See Comments)     shakes  shakes       PAST MEDICAL HISTORY:  Past Medical History:   Diagnosis Date     CAD (coronary artery disease)     11/17/2011 Lexiscan Nuclear study - mild anterior/apical ischemia (LAD), 11/2011 CT cor angio - nonobstructive CAD, Calcium score 0     Cancer (H)     Basal cell face     CKD (chronic kidney disease)      Frequent UTI      Hyperlipidemia      Hypertension      Neuropathy     secondary to low back DJD     Obese      PHILLIP (obstructive sleep apnea)      Osteopenia      Pyelonephritis      Thrombocytopenia (H)     ITP       PAST SURGICAL HISTORY:  Past Surgical History:   Procedure Laterality Date     APPENDECTOMY       CATARACT IOL, RT/LT  2005     CHOLECYSTECTOMY       ENT SURGERY      basal cell     ESOPHAGOSCOPY, GASTROSCOPY, DUODENOSCOPY (EGD), COMBINED N/A 10/6/2014    Procedure: COMBINED ESOPHAGOSCOPY, GASTROSCOPY, DUODENOSCOPY (EGD);  Surgeon: Rell Torres MD;  Location:  GI     GYN SURGERY      tubal ligation, hysterectomy     ORTHOPEDIC SURGERY      jaw surgery, spine stenosis - decompression L 3-5 surgery       FAMILY HISTORY:  Family History   Problem Relation Age of Onset     Breast Cancer Daughter      Heart Disease Sister      C.A.D. Maternal Grandfather      Cancer - colorectal " Brother        SOCIAL HISTORY:  Social History     Socioeconomic History     Marital status:      Spouse name: None     Number of children: None     Years of education: None     Highest education level: None   Occupational History     None   Social Needs     Financial resource strain: None     Food insecurity     Worry: None     Inability: None     Transportation needs     Medical: None     Non-medical: None   Tobacco Use     Smoking status: Former Smoker     Quit date: 1970     Years since quittin.3     Smokeless tobacco: Never Used   Substance and Sexual Activity     Alcohol use: Yes     Comment: once per month if that     Drug use: None     Sexual activity: Never   Lifestyle     Physical activity     Days per week: None     Minutes per session: None     Stress: None   Relationships     Social connections     Talks on phone: None     Gets together: None     Attends Sikh service: None     Active member of club or organization: None     Attends meetings of clubs or organizations: None     Relationship status: None     Intimate partner violence     Fear of current or ex partner: None     Emotionally abused: None     Physically abused: None     Forced sexual activity: None   Other Topics Concern     Parent/sibling w/ CABG, MI or angioplasty before 65F 55M? No      Service Not Asked     Blood Transfusions Not Asked     Caffeine Concern No     Occupational Exposure Not Asked     Hobby Hazards Not Asked     Sleep Concern Yes     Comment: sleep apnea, wears cpap at night     Stress Concern No     Weight Concern No     Special Diet Yes     Comment: low sodium     Back Care Not Asked     Exercise No     Bike Helmet Not Asked     Seat Belt Yes     Self-Exams Not Asked   Social History Narrative    She lives in a condo currently in [place]               Review of Systems:  Skin:  Negative       Eyes:  Positive for glasses macular degeneration; reading glasses  ENT:  Positive for hearing  "loss;sinus trouble    Respiratory:  Positive for sleep apnea;CPAP;dyspnea on exertion;cough dry cough   Cardiovascular:  Negative;chest pain;cyanosis;syncope or near-syncope Positive for;lightheadedness;dizziness;edema pain in left arm  chest discomfort last week for 10 minutes  Gastroenterology: Positive for reflux;heartburn    Genitourinary:  Positive for nocturia    Musculoskeletal:  Positive for neck pain;back pain;arthritis;joint pain;nocturnal cramping knees  Neurologic:  Positive for numbness or tingling of hands neuropathy  Psychiatric:  Negative sleep disturbances    Heme/Lymph/Imm:  Negative weight loss    Endocrine:  Negative        Physical Exam:  Vitals: BP (!) 150/76 (BP Location: Right arm, Patient Position: Sitting, Cuff Size: Adult Regular)   Pulse 66   Ht 1.626 m (5' 4\")   Wt 90.7 kg (200 lb)   SpO2 94%   BMI 34.33 kg/m      Constitutional:  cooperative;in no acute distress morbidly obese      Skin:  warm and dry to the touch          Head:  normocephalic        Eyes:           Lymph:      ENT:  no pallor or cyanosis, dentition good        Neck:  no carotid bruit        Respiratory:  clear to auscultation;normal symmetry         Cardiac: regular rhythm                  pulses below the femoral arteries are diminished                                      GI:  abdomen soft obese      Extremities and Muscular Skeletal:  no deformities, clubbing, cyanosis, erythema observed;no edema              Neurological:  affect appropriate   gait imbalance    Psych:  Alert and Oriented x 3      Thank you for allowing me to participate in the care of your patient.      Sincerely,     Ann Knapp DO     [facility]    cc:   No referring provider defined for this encounter.        "

## 2021-06-03 NOTE — PROGRESS NOTES
Service Date: 06/03/2021    REFERRING PROVIDER:  Nga Conde MD    SUBJECTIVE:  Ms. Barrios is a pleasant 86-year-old female with a history of nonobstructive coronary artery disease, hypertension, mixed hyperlipidemia, morbid obesity, sleep apnea, chronic kidney disease and neuropathy with frequent falling.  I have not seen her since 2018.  She came in today with complaints about her heart rate.  She is having episodes where her heart races really fast.  It can happen at any time.  She also notes that her blood pressure has been elevated.  She has had a recent increase in one of her medications, hydralazine, from 3 times a day to 4 times, but it continues to be elevated.  Back when I was seeing her, she had been on valsartan and well-controlled on that medication, but this was removed from the market and she did not tolerate losartan apparently.  I asked her about amlodipine.  She does not recall that medicine and it is not on her allergy list, which is pretty extensive.  That may be another possible option for her for elevated blood pressures as well.  She does have treated sleep apnea and again her blood pressures are not well-controlled.  I am concerned about the possibility of arrhythmia, given her symptoms.  We talked a little bit about this today.  We did perform an electrocardiogram in office, which shows a normal sinus rhythm with a right bundle branch block.  This is new from her previous in 2018.  There are no acute ST or T-wave abnormalities.  There is some baseline artifact from muscle tremor, likely.    PHYSICAL EXAMINATION:  Today, her blood pressure is 150/76 and a pulse of 66.  Carotid upstrokes were brisk without bruit.  Cardiovascular tones were regular today.  I did not appreciate a murmur, gallop or rub.  Lungs are clear posteriorly.    In summary, Ms. Barrios is a very pleasant 86-year-old female with symptoms of palpitations and racing heart.  She has underlying history of nonobstructive coronary  artery disease, hypertension, mixed hyperlipidemia, obesity and sleep apnea.  Other morbidities include chronic kidney disease and neuropathy with frequent falling.  She has elevated blood pressures today, which have not been well-controlled despite increases in hydralazine.  1.  I would like her to wear a Zio Patch monitor to assess for arrhythmias, given her symptoms of palpitations and a racing heart.  Most concerningly would be atrial fibrillation.  She is at risk for this given her morbid obesity, hypertension, and sleep apnea as risk factors, as well as age.  She is agreeable to wearing this and I will order this today as well as a followup visit.  I did not make any recommendations for medication changes for her blood pressure and the reason why is she has a list of allergies and I suspect she may have been on amlodipine in the past, even though she does not recognize this name.  This may be a good option for her if her blood pressures continue to be elevated if she has not tried that.  I will leave that to the discretion of her primary doctor, Dr. Conde.  We will see her back in clinic with the results of her Zio Patch monitor and any further management needed.    Please feel free to contact me with any questions you have in regards to her care.    cc:  Nga Conde MD  [facility]  [address] [place] [address]    Ann Knapp DO        D: 2021   T: 2021   MT: al    Name:     YASIR ROBLERO  MRN:      [id number]        Account:      [id number]   :      1935           Service Date: 2021       Document: [id number]

## 2021-06-03 NOTE — PROGRESS NOTES
HPI and Plan:   See dictation    Orders Placed This Encounter   Procedures     Follow-Up with Cardiac Advanced Practice Provider     EKG 12-lead complete w/read - Clinics (performed today)     Leadless EKG Monitor 8 to 14 Days       Orders Placed This Encounter   Medications     hydrALAZINE (APRESOLINE) 25 MG tablet     Sig: TAKE ONE TABLET BY MOUTH FOUR TIMES DAILY       Medications Discontinued During This Encounter   Medication Reason     OSCAL 500/200 D-3 OR Stopped by Patient     oxybutynin (DITROPAN-XL) 10 MG 24 hr tablet Stopped by Patient     valsartan (DIOVAN) 80 MG tablet Stopped by Patient         Encounter Diagnoses   Name Primary?     Heart murmur Yes     Tachycardia      Palpitations      Essential hypertension      Coronary artery disease involving native coronary artery of native heart without angina pectoris      Morbid obesity (H)        CURRENT MEDICATIONS:  Current Outpatient Medications   Medication Sig Dispense Refill     ASPIRIN 81 MG OR TABS 1 TABLET DAILY, takes AFTERNOON       B-12 500 MCG OR TABS DAILY       carvedilol (COREG) 25 MG tablet Take 1 tablet (25 mg) by mouth 2 times daily (with meals) 180 tablet 3     CENTRUM SILVER OR DAILY       Cranberry 300 MG TABS Take by mouth daily       furosemide (LASIX) 40 MG tablet Take 1 tablet (40 mg) by mouth 2 times daily (Patient taking differently: Take 40 mg by mouth Take 80 mg in AM, take 40 mg in PM, take additional 40 mg in PM, as directed) 180 tablet 4     hydrALAZINE (APRESOLINE) 25 MG tablet TAKE ONE TABLET BY MOUTH FOUR TIMES DAILY       pravastatin (PRAVACHOL) 20 MG tablet Take 1 tab by mouth daily in the evening 90 tablet 1     Vitamin D, Cholecalciferol, 1000 UNITS CAPS Take by mouth 2 times daily       nitroglycerin (NITROSTAT) 0.4 MG sublingual tablet Place 1 tablet (0.4 mg) under the tongue every 5 minutes as needed for chest pain (Patient not taking: Reported on 6/3/2021) 25 tablet 1       ALLERGIES     Allergies   Allergen  "Reactions     Neurontin [Gabapentin] Other (See Comments)     hallucinations     Aspirin      In large doses     Azithromycin Other (See Comments)     Chest pain     Cipro [Ciprofloxacin]      Chest pain     Irbesartan Unknown     Morphine Visual Disturbance     Pt. Stated \"i do not like  Morphine. It makes me feel goofy.\"     Pcn [Penicillins]      rash     Prinivil [Lisinopril]      cough     Sulfa Drugs Other (See Comments)     shakes  shakes       PAST MEDICAL HISTORY:  Past Medical History:   Diagnosis Date     CAD (coronary artery disease)     11/17/2011 Lexiscan Nuclear study - mild anterior/apical ischemia (LAD), 11/2011 CT cor angio - nonobstructive CAD, Calcium score 0     Cancer (H)     Basal cell face     CKD (chronic kidney disease)      Frequent UTI      Hyperlipidemia      Hypertension      Neuropathy     secondary to low back DJD     Obese      PHILLIP (obstructive sleep apnea)      Osteopenia      Pyelonephritis      Thrombocytopenia (H)     ITP       PAST SURGICAL HISTORY:  Past Surgical History:   Procedure Laterality Date     APPENDECTOMY       CATARACT IOL, RT/LT  2005     CHOLECYSTECTOMY       ENT SURGERY      basal cell     ESOPHAGOSCOPY, GASTROSCOPY, DUODENOSCOPY (EGD), COMBINED N/A 10/6/2014    Procedure: COMBINED ESOPHAGOSCOPY, GASTROSCOPY, DUODENOSCOPY (EGD);  Surgeon: Rell Torres MD;  Location:  GI     GYN SURGERY      tubal ligation, hysterectomy     ORTHOPEDIC SURGERY      jaw surgery, spine stenosis - decompression L 3-5 surgery       FAMILY HISTORY:  Family History   Problem Relation Age of Onset     Breast Cancer Daughter      Heart Disease Sister      C.A.D. Maternal Grandfather      Cancer - colorectal Brother        SOCIAL HISTORY:  Social History     Socioeconomic History     Marital status:      Spouse name: None     Number of children: None     Years of education: None     Highest education level: None   Occupational History     None   Social Needs     Financial " resource strain: None     Food insecurity     Worry: None     Inability: None     Transportation needs     Medical: None     Non-medical: None   Tobacco Use     Smoking status: Former Smoker     Quit date: 1970     Years since quittin.3     Smokeless tobacco: Never Used   Substance and Sexual Activity     Alcohol use: Yes     Comment: once per month if that     Drug use: None     Sexual activity: Never   Lifestyle     Physical activity     Days per week: None     Minutes per session: None     Stress: None   Relationships     Social connections     Talks on phone: None     Gets together: None     Attends Religion service: None     Active member of club or organization: None     Attends meetings of clubs or organizations: None     Relationship status: None     Intimate partner violence     Fear of current or ex partner: None     Emotionally abused: None     Physically abused: None     Forced sexual activity: None   Other Topics Concern     Parent/sibling w/ CABG, MI or angioplasty before 65F 55M? No      Service Not Asked     Blood Transfusions Not Asked     Caffeine Concern No     Occupational Exposure Not Asked     Hobby Hazards Not Asked     Sleep Concern Yes     Comment: sleep apnea, wears cpap at night     Stress Concern No     Weight Concern No     Special Diet Yes     Comment: low sodium     Back Care Not Asked     Exercise No     Bike Helmet Not Asked     Seat Belt Yes     Self-Exams Not Asked   Social History Narrative    She lives in a condo currently in [place]               Review of Systems:  Skin:  Negative       Eyes:  Positive for glasses macular degeneration; reading glasses  ENT:  Positive for hearing loss;sinus trouble    Respiratory:  Positive for sleep apnea;CPAP;dyspnea on exertion;cough dry cough   Cardiovascular:  Negative;chest pain;cyanosis;syncope or near-syncope Positive for;lightheadedness;dizziness;edema pain in left arm  chest discomfort last week for 10  "minutes  Gastroenterology: Positive for reflux;heartburn    Genitourinary:  Positive for nocturia    Musculoskeletal:  Positive for neck pain;back pain;arthritis;joint pain;nocturnal cramping knees  Neurologic:  Positive for numbness or tingling of hands neuropathy  Psychiatric:  Negative sleep disturbances    Heme/Lymph/Imm:  Negative weight loss    Endocrine:  Negative        Physical Exam:  Vitals: BP (!) 150/76 (BP Location: Right arm, Patient Position: Sitting, Cuff Size: Adult Regular)   Pulse 66   Ht 1.626 m (5' 4\")   Wt 90.7 kg (200 lb)   SpO2 94%   BMI 34.33 kg/m      Constitutional:  cooperative;in no acute distress morbidly obese      Skin:  warm and dry to the touch          Head:  normocephalic        Eyes:           Lymph:      ENT:  no pallor or cyanosis, dentition good        Neck:  no carotid bruit        Respiratory:  clear to auscultation;normal symmetry         Cardiac: regular rhythm                  pulses below the femoral arteries are diminished                                      GI:  abdomen soft obese      Extremities and Muscular Skeletal:  no deformities, clubbing, cyanosis, erythema observed;no edema              Neurological:  affect appropriate   gait imbalance    Psych:  Alert and Oriented x 3          CC  No referring provider defined for this encounter.                  "

## 2021-06-10 ENCOUNTER — HOSPITAL ENCOUNTER (OUTPATIENT)
Dept: CARDIOLOGY | Facility: CLINIC | Age: 86
Discharge: HOME | End: 2021-06-10
Attending: INTERNAL MEDICINE | Admitting: INTERNAL MEDICINE
Payer: COMMERCIAL

## 2021-06-10 DIAGNOSIS — R00.2 PALPITATIONS: ICD-10-CM

## 2021-06-10 DIAGNOSIS — R00.0 TACHYCARDIA: ICD-10-CM

## 2021-06-10 PROCEDURE — 93246 EXT ECG>7D<15D RECORDING: CPT

## 2021-06-10 PROCEDURE — 93248 EXT ECG>7D<15D REV&INTERPJ: CPT | Performed by: INTERNAL MEDICINE

## 2021-08-02 NOTE — PROGRESS NOTES
HISTORY OF PRESENT ILLNESS:    This is a 86 year old female who follows with Dr. Knapp at [facility]   Her past medical history includes:  Hypertension, coronary artery disease, sleep apnea, mixed hyperlipidemia, CKD, morbid obesity, and peripheral neuropathy with frequent falling.    Ms Barrios has a long history of treated hypertension mainly managed by her PMD    CT coronary angiogram (2011) showed nonobstructive coronary artery disease.     ECHO (2018) showed LVEF 60-65%, grade I diastolic dysfunction, normal RV function, 1-2+ TR, RVSP 34 mmHg    She was seen in the ED for significant hypertension in April and her hydralazine was increased to 4 times/day.  She was then seen by Dr. Knapp (6/2021) with complaints of episodic prolonged palpitations.  EKG in the office showed SR with a new right bundle branch block.  She was also hypertensive.  A ZioPatch monitor was ordered    Our visit today is for further review.    Ms Barrios comes in today with her family member who assists with her history and symptoms.  She denies any recent chest pain.  She admits to being inactive due to her peripheral neuropathy.  She states that her legs have been swollen for many years.  They improve some by morning, but have been fairly consistently swollen.  She denies any shortness of breath.  She has not had any further prolonged palpitations and just describes brief heart pounding.  She checks her BP at home and many times notes SBP up to 160 mmHg in the evening.        I reviewed her 14-day ZioPatch results (6/10/21)  This showed SR with BBB  Her average HR was 68 bpm  She did have 21 SVT episodes up to 14 seconds.  2.2% PAC burden and < 1% PVC burden      VITAL SIGNS:  BP: 150/72  Pulse: 63  Weight: patient reported 250 lbs (BMI: 41)    IMPRESSION AND PLAN:    Palpitations  -ZioPatch showed SR with paroxysmal supraventricular tachycardia  No A-fib  -continue Coreg    Hypertension:  -on Coreg 25mg BID, Lasix 120 mg,  Hydralazine 25 mg QID  -BP elevated  -will add Amlodipine 2.5 mg every evening  -return in 6 wks.    Chronic Lymphedema  -encouraged low salt diet and leg elevation  -placed order for lymphedema clinic    Sleep Apnea  -uses CPAP  -getting her mask analyzed later this month    Hyperlipidemia:  -on Pravastatin 20 mg  -due for lipid panel    Mild, nonobstructive CAD per CTA (2011)            Orders Placed This Encounter   Procedures     LYMPHEDEMA THERAPY REFERRAL     Follow-Up with Cardiac Advanced Practice Provider       Orders Placed This Encounter   Medications     acetaminophen (TYLENOL) 500 MG tablet     Sig: Take 500-1,000 mg by mouth every 6 hours as needed for mild pain     amLODIPine (NORVASC) 2.5 MG tablet     Sig: Take 1 tablet (2.5 mg) by mouth daily     Dispense:  90 tablet     Refill:  3       There are no discontinued medications.      Encounter Diagnoses   Name Primary?     Heart murmur      Tachycardia      Palpitations      Essential hypertension      Coronary artery disease involving native coronary artery of native heart without angina pectoris      Morbid obesity (H)      Lymphedema Yes       CURRENT MEDICATIONS:  Current Outpatient Medications   Medication Sig Dispense Refill     acetaminophen (TYLENOL) 500 MG tablet Take 500-1,000 mg by mouth every 6 hours as needed for mild pain       amLODIPine (NORVASC) 2.5 MG tablet Take 1 tablet (2.5 mg) by mouth daily 90 tablet 3     ASPIRIN 81 MG OR TABS 1 TABLET DAILY, takes AFTERNOON       B-12 500 MCG OR TABS DAILY       carvedilol (COREG) 25 MG tablet Take 1 tablet (25 mg) by mouth 2 times daily (with meals) 180 tablet 3     CENTRUM SILVER OR DAILY       Cranberry 300 MG TABS Take by mouth daily       furosemide (LASIX) 40 MG tablet Take 1 tablet (40 mg) by mouth 2 times daily (Patient taking differently: Take 40 mg by mouth Take 80 mg in AM, take 40 mg in PM, take additional 40 mg in PM, as directed) 180 tablet 4     hydrALAZINE (APRESOLINE) 25 MG  "tablet TAKE ONE TABLET BY MOUTH FOUR TIMES DAILY       pravastatin (PRAVACHOL) 20 MG tablet Take 1 tab by mouth daily in the evening 90 tablet 1     Vitamin D, Cholecalciferol, 1000 UNITS CAPS Take by mouth 2 times daily       nitroglycerin (NITROSTAT) 0.4 MG sublingual tablet Place 1 tablet (0.4 mg) under the tongue every 5 minutes as needed for chest pain (Patient not taking: Reported on 6/3/2021) 25 tablet 1       ALLERGIES     Allergies   Allergen Reactions     Neurontin [Gabapentin] Other (See Comments)     hallucinations     Aspirin      In large doses     Azithromycin Other (See Comments)     Chest pain     Cipro [Ciprofloxacin]      Chest pain     Irbesartan Unknown     Morphine Visual Disturbance     Pt. Stated \"i do not like  Morphine. It makes me feel goofy.\"     Pcn [Penicillins]      rash     Prinivil [Lisinopril]      cough     Sulfa Drugs Other (See Comments)     shakes  shakes       PAST MEDICAL HISTORY:  Past Medical History:   Diagnosis Date     CAD (coronary artery disease)     11/17/2011 Lexiscan Nuclear study - mild anterior/apical ischemia (LAD), 11/2011 CT cor angio - nonobstructive CAD, Calcium score 0     Cancer (H)     Basal cell face     CKD (chronic kidney disease)      Frequent UTI      Hyperlipidemia      Hypertension      Neuropathy     secondary to low back DJD     Obese      PHILLIP (obstructive sleep apnea)      Osteopenia      Pyelonephritis      Thrombocytopenia (H)     ITP       PAST SURGICAL HISTORY:  Past Surgical History:   Procedure Laterality Date     APPENDECTOMY       CATARACT IOL, RT/LT  2005     CHOLECYSTECTOMY       ENT SURGERY      basal cell     ESOPHAGOSCOPY, GASTROSCOPY, DUODENOSCOPY (EGD), COMBINED N/A 10/6/2014    Procedure: COMBINED ESOPHAGOSCOPY, GASTROSCOPY, DUODENOSCOPY (EGD);  Surgeon: Rell Torres MD;  Location:  GI     GYN SURGERY      tubal ligation, hysterectomy     ORTHOPEDIC SURGERY      jaw surgery, spine stenosis - decompression L 3-5 surgery "       FAMILY HISTORY:  Family History   Problem Relation Age of Onset     Breast Cancer Daughter      Heart Disease Sister      RHONDA. Maternal Grandfather      Cancer - colorectal Brother        SOCIAL HISTORY:  Social History     Socioeconomic History     Marital status:      Spouse name: None     Number of children: None     Years of education: None     Highest education level: None   Occupational History     None   Tobacco Use     Smoking status: Former Smoker     Quit date: 1970     Years since quittin.5     Smokeless tobacco: Never Used   Substance and Sexual Activity     Alcohol use: Not Currently     Comment: once per month if that     Drug use: None     Sexual activity: Never   Other Topics Concern     Parent/sibling w/ CABG, MI or angioplasty before 65F 55M? No      Service Not Asked     Blood Transfusions Not Asked     Caffeine Concern No     Occupational Exposure Not Asked     Hobby Hazards Not Asked     Sleep Concern Yes     Comment: sleep apnea, wears cpap at night     Stress Concern No     Weight Concern No     Special Diet Yes     Comment: low sodium     Back Care Not Asked     Exercise No     Bike Helmet Not Asked     Seat Belt Yes     Self-Exams Not Asked   Social History Narrative    She lives in a [facility] currently in [place]             Social Determinants of Health     Financial Resource Strain:      Difficulty of Paying Living Expenses:    Food Insecurity:      Worried About Running Out of Food in the Last Year:      Ran Out of Food in the Last Year:    Transportation Needs:      Lack of Transportation (Medical):      Lack of Transportation (Non-Medical):    Physical Activity:      Days of Exercise per Week:      Minutes of Exercise per Session:    Stress:      Feeling of Stress :    Social Connections:      Frequency of Communication with Friends and Family:      Frequency of Social Gatherings with Friends and Family:      Attends [employer] Services:      Active  "Member of Clubs or Organizations:      Attends Club or Organization Meetings:      Marital Status:    Intimate Partner Violence:      Fear of Current or Ex-Partner:      Emotionally Abused:      Physically Abused:      Sexually Abused:        Review of Systems:  Skin:  Negative       Eyes:  Positive for glasses macular degeneration; reading glasses  ENT:  Positive for hearing loss;sinus trouble    Respiratory:  Positive for sleep apnea;CPAP;dyspnea on exertion;cough dry cough   Cardiovascular:  Negative;chest pain;cyanosis;syncope or near-syncope Positive for;lightheadedness;dizziness;edema;fatigue Fast HR  Gastroenterology: Positive for reflux;heartburn    Genitourinary:  Positive for nocturia    Musculoskeletal:  Positive for neck pain;back pain;arthritis;joint pain;nocturnal cramping knees  Neurologic:  Positive for numbness or tingling of hands;headaches neuropathy  Psychiatric:  Negative      Heme/Lymph/Imm:  Negative weight loss    Endocrine:  Negative        Physical Exam:  Vitals: BP (!) 150/72 (BP Location: Right arm, Patient Position: Sitting, Cuff Size: Adult Regular)   Pulse 63   Ht 1.651 m (5' 5\")   Wt 113.4 kg (250 lb)   SpO2 96%   BMI 41.60 kg/m      Constitutional:  cooperative;in no acute distress morbidly obese      Skin:  warm and dry to the touch          Head:  normocephalic        Eyes:           Lymph:      ENT:  no pallor or cyanosis        Neck:  no carotid bruit        Respiratory:  clear to auscultation;normal symmetry         Cardiac: regular rhythm     no presence of murmur            pulses below the femoral arteries are diminished                                      GI:  abdomen soft obese      Extremities and Muscular Skeletal:      bilateral LE edema;2+;1+          Neurological:  affect appropriate   gait imbalance    Psych:  Alert and Oriented x 3          CC  Ann Knapp DO  [id number]  [place] [id number]                  "

## 2021-08-03 ENCOUNTER — OFFICE VISIT (OUTPATIENT)
Dept: CARDIOLOGY | Facility: CLINIC | Age: 86
End: 2021-08-03
Attending: INTERNAL MEDICINE
Payer: COMMERCIAL

## 2021-08-03 VITALS
OXYGEN SATURATION: 96 % | WEIGHT: 250 LBS | SYSTOLIC BLOOD PRESSURE: 150 MMHG | DIASTOLIC BLOOD PRESSURE: 72 MMHG | HEIGHT: 65 IN | HEART RATE: 63 BPM | BODY MASS INDEX: 41.65 KG/M2

## 2021-08-03 DIAGNOSIS — R00.0 TACHYCARDIA: ICD-10-CM

## 2021-08-03 DIAGNOSIS — I10 ESSENTIAL HYPERTENSION: ICD-10-CM

## 2021-08-03 DIAGNOSIS — R01.1 HEART MURMUR: ICD-10-CM

## 2021-08-03 DIAGNOSIS — I89.0 LYMPHEDEMA: Primary | ICD-10-CM

## 2021-08-03 DIAGNOSIS — I25.10 CORONARY ARTERY DISEASE INVOLVING NATIVE CORONARY ARTERY OF NATIVE HEART WITHOUT ANGINA PECTORIS: ICD-10-CM

## 2021-08-03 DIAGNOSIS — E66.01 MORBID OBESITY (H): ICD-10-CM

## 2021-08-03 DIAGNOSIS — R00.2 PALPITATIONS: ICD-10-CM

## 2021-08-03 PROCEDURE — 99214 OFFICE O/P EST MOD 30 MIN: CPT | Performed by: NURSE PRACTITIONER

## 2021-08-03 RX ORDER — AMLODIPINE BESYLATE 2.5 MG/1
2.5 TABLET ORAL DAILY
Qty: 90 TABLET | Refills: 3 | Status: SHIPPED | OUTPATIENT
Start: 2021-08-03 | End: 2021-08-12 | Stop reason: SINTOL

## 2021-08-03 RX ORDER — ACETAMINOPHEN 500 MG
500-1000 TABLET ORAL EVERY 6 HOURS PRN
COMMUNITY

## 2021-08-03 ASSESSMENT — MIFFLIN-ST. JEOR: SCORE: 1584.87

## 2021-08-03 NOTE — LETTER
8/3/2021    Nga Conde MD, MD  [facility] [address]  [facility] [address]    RE: Karli Barrios       Dear Colleague,    I had the pleasure of seeing Karli Barrios in the [facility].    HISTORY OF PRESENT ILLNESS:    This is a 86 year old female who follows with Dr. Knapp at [facility]   Her past medical history includes:  Hypertension, coronary artery disease, sleep apnea, mixed hyperlipidemia, CKD, morbid obesity, and peripheral neuropathy with frequent falling.    Ms Barrios has a long history of treated hypertension mainly managed by her PMD    CT coronary angiogram (2011) showed nonobstructive coronary artery disease.     ECHO (2018) showed LVEF 60-65%, grade I diastolic dysfunction, normal RV function, 1-2+ TR, RVSP 34 mmHg    She was seen in the ED for significant hypertension in April and her hydralazine was increased to 4 times/day.  She was then seen by Dr. Knapp (6/2021) with complaints of episodic prolonged palpitations.  EKG in the office showed SR with a new right bundle branch block.  She was also hypertensive.  A ZioPatch monitor was ordered    Our visit today is for further review.    Ms Barrios comes in today with her family member who assists with her history and symptoms.  She denies any recent chest pain.  She admits to being inactive due to her peripheral neuropathy.  She states that her legs have been swollen for many years.  They improve some by morning, but have been fairly consistently swollen.  She denies any shortness of breath.  She has not had any further prolonged palpitations and just describes brief heart pounding.  She checks her BP at home and many times notes SBP up to 160 mmHg in the evening.        I reviewed her 14-day ZioPatch results (6/10/21)  This showed SR with BBB  Her average HR was 68 bpm  She did have 21 SVT episodes up to 14 seconds.  2.2% PAC burden and < 1% PVC  burden      VITAL SIGNS:  BP: 150/72  Pulse: 63  Weight: patient reported 250 lbs (BMI: 41)    IMPRESSION AND PLAN:    Palpitations  -ZioPatch showed SR with paroxysmal supraventricular tachycardia  No A-fib  -continue Coreg    Hypertension:  -on Coreg 25mg BID, Lasix 120 mg, Hydralazine 25 mg QID  -BP elevated  -will add Amlodipine 2.5 mg every evening  -return in 6 wks.    Chronic Lymphedema  -encouraged low salt diet and leg elevation  -placed order for lymphedema clinic    Sleep Apnea  -uses CPAP  -getting her mask analyzed later this month    Hyperlipidemia:  -on Pravastatin 20 mg  -due for lipid panel    Mild, nonobstructive CAD per CTA (2011)            Orders Placed This Encounter   Procedures     LYMPHEDEMA THERAPY REFERRAL     Follow-Up with Cardiac Advanced Practice Provider       Orders Placed This Encounter   Medications     acetaminophen (TYLENOL) 500 MG tablet     Sig: Take 500-1,000 mg by mouth every 6 hours as needed for mild pain     amLODIPine (NORVASC) 2.5 MG tablet     Sig: Take 1 tablet (2.5 mg) by mouth daily     Dispense:  90 tablet     Refill:  3       There are no discontinued medications.      Encounter Diagnoses   Name Primary?     Heart murmur      Tachycardia      Palpitations      Essential hypertension      Coronary artery disease involving native coronary artery of native heart without angina pectoris      Morbid obesity (H)      Lymphedema Yes       CURRENT MEDICATIONS:  Current Outpatient Medications   Medication Sig Dispense Refill     acetaminophen (TYLENOL) 500 MG tablet Take 500-1,000 mg by mouth every 6 hours as needed for mild pain       amLODIPine (NORVASC) 2.5 MG tablet Take 1 tablet (2.5 mg) by mouth daily 90 tablet 3     ASPIRIN 81 MG OR TABS 1 TABLET DAILY, takes AFTERNOON       B-12 500 MCG OR TABS DAILY       carvedilol (COREG) 25 MG tablet Take 1 tablet (25 mg) by mouth 2 times daily (with meals) 180 tablet 3     CENTRUM SILVER OR DAILY       Cranberry 300 MG TABS  "Take by mouth daily       furosemide (LASIX) 40 MG tablet Take 1 tablet (40 mg) by mouth 2 times daily (Patient taking differently: Take 40 mg by mouth Take 80 mg in AM, take 40 mg in PM, take additional 40 mg in PM, as directed) 180 tablet 4     hydrALAZINE (APRESOLINE) 25 MG tablet TAKE ONE TABLET BY MOUTH FOUR TIMES DAILY       pravastatin (PRAVACHOL) 20 MG tablet Take 1 tab by mouth daily in the evening 90 tablet 1     Vitamin D, Cholecalciferol, 1000 UNITS CAPS Take by mouth 2 times daily       nitroglycerin (NITROSTAT) 0.4 MG sublingual tablet Place 1 tablet (0.4 mg) under the tongue every 5 minutes as needed for chest pain (Patient not taking: Reported on 6/3/2021) 25 tablet 1       ALLERGIES     Allergies   Allergen Reactions     Neurontin [Gabapentin] Other (See Comments)     hallucinations     Aspirin      In large doses     Azithromycin Other (See Comments)     Chest pain     Cipro [Ciprofloxacin]      Chest pain     Irbesartan Unknown     Morphine Visual Disturbance     Pt. Stated \"i do not like  Morphine. It makes me feel goofy.\"     Pcn [Penicillins]      rash     Prinivil [Lisinopril]      cough     Sulfa Drugs Other (See Comments)     shakes  shakes       PAST MEDICAL HISTORY:  Past Medical History:   Diagnosis Date     CAD (coronary artery disease)     11/17/2011 Lexiscan Nuclear study - mild anterior/apical ischemia (LAD), 11/2011 CT cor angio - nonobstructive CAD, Calcium score 0     Cancer (H)     Basal cell face     CKD (chronic kidney disease)      Frequent UTI      Hyperlipidemia      Hypertension      Neuropathy     secondary to low back DJD     Obese      PHILLIP (obstructive sleep apnea)      Osteopenia      Pyelonephritis      Thrombocytopenia (H)     ITP       PAST SURGICAL HISTORY:  Past Surgical History:   Procedure Laterality Date     APPENDECTOMY       CATARACT IOL, RT/LT  2005     CHOLECYSTECTOMY       ENT SURGERY      basal cell     ESOPHAGOSCOPY, GASTROSCOPY, DUODENOSCOPY (EGD), " COMBINED N/A 10/6/2014    Procedure: COMBINED ESOPHAGOSCOPY, GASTROSCOPY, DUODENOSCOPY (EGD);  Surgeon: Rell Torres MD;  Location:  GI     GYN SURGERY      tubal ligation, hysterectomy     ORTHOPEDIC SURGERY      jaw surgery, spine stenosis - decompression L 3-5 surgery       FAMILY HISTORY:  Family History   Problem Relation Age of Onset     Breast Cancer Daughter      Heart Disease Sister      C.A.D. Maternal Grandfather      Cancer - colorectal Brother        SOCIAL HISTORY:  Social History     Socioeconomic History     Marital status:      Spouse name: None     Number of children: None     Years of education: None     Highest education level: None   Occupational History     None   Tobacco Use     Smoking status: Former Smoker     Quit date: 1970     Years since quittin.5     Smokeless tobacco: Never Used   Substance and Sexual Activity     Alcohol use: Not Currently     Comment: once per month if that     Drug use: None     Sexual activity: Never   Other Topics Concern     Parent/sibling w/ CABG, MI or angioplasty before 65F 55M? No      Service Not Asked     Blood Transfusions Not Asked     Caffeine Concern No     Occupational Exposure Not Asked     Hobby Hazards Not Asked     Sleep Concern Yes     Comment: sleep apnea, wears cpap at night     Stress Concern No     Weight Concern No     Special Diet Yes     Comment: low sodium     Back Care Not Asked     Exercise No     Bike Helmet Not Asked     Seat Belt Yes     Self-Exams Not Asked   Social History Narrative    She lives in a condo currently in [place]             Social Determinants of Health     Financial Resource Strain:      Difficulty of Paying Living Expenses:    Food Insecurity:      Worried About Running Out of Food in the Last Year:      Ran Out of Food in the Last Year:    Transportation Needs:      Lack of Transportation (Medical):      Lack of Transportation (Non-Medical):    Physical Activity:      Days of  "Exercise per Week:      Minutes of Exercise per Session:    Stress:      Feeling of Stress :    Social Connections:      Frequency of Communication with Friends and Family:      Frequency of Social Gatherings with Friends and Family:      Attends Confucianist Services:      Active Member of Clubs or Organizations:      Attends Club or Organization Meetings:      Marital Status:    Intimate Partner Violence:      Fear of Current or Ex-Partner:      Emotionally Abused:      Physically Abused:      Sexually Abused:        Review of Systems:  Skin:  Negative       Eyes:  Positive for glasses macular degeneration; reading glasses  ENT:  Positive for hearing loss;sinus trouble    Respiratory:  Positive for sleep apnea;CPAP;dyspnea on exertion;cough dry cough   Cardiovascular:  Negative;chest pain;cyanosis;syncope or near-syncope Positive for;lightheadedness;dizziness;edema;fatigue Fast HR  Gastroenterology: Positive for reflux;heartburn    Genitourinary:  Positive for nocturia    Musculoskeletal:  Positive for neck pain;back pain;arthritis;joint pain;nocturnal cramping knees  Neurologic:  Positive for numbness or tingling of hands;headaches neuropathy  Psychiatric:  Negative      Heme/Lymph/Imm:  Negative weight loss    Endocrine:  Negative        Physical Exam:  Vitals: BP (!) 150/72 (BP Location: Right arm, Patient Position: Sitting, Cuff Size: Adult Regular)   Pulse 63   Ht 1.651 m (5' 5\")   Wt 113.4 kg (250 lb)   SpO2 96%   BMI 41.60 kg/m      Constitutional:  cooperative;in no acute distress morbidly obese      Skin:  warm and dry to the touch          Head:  normocephalic        Eyes:           Lymph:      ENT:  no pallor or cyanosis        Neck:  no carotid bruit        Respiratory:  clear to auscultation;normal symmetry         Cardiac: regular rhythm     no presence of murmur            pulses below the femoral arteries are diminished                                      GI:  abdomen soft obese      Extremities " and Muscular Skeletal:      bilateral LE edema;2+;1+          Neurological:  affect appropriate   gait imbalance    Psych:  Alert and Oriented x 3          CC  Ann Knapp,   [address]  [place] [id number]                      Thank you for allowing me to participate in the care of your patient.      Sincerely,     [facility]  cc:   Ann Knapp DO  [address]  [place] [address]

## 2021-08-05 ENCOUNTER — CARE COORDINATION (OUTPATIENT)
Dept: CARDIOLOGY | Facility: CLINIC | Age: 86
End: 2021-08-05

## 2021-08-05 NOTE — PROGRESS NOTES
Called pt and reviewed recommendations from Dr. Carl. Pt stated understanding. She will try taking in am instead of pm, and call if further concerns or chest pain.     Yenny Avila RN, BSN  08/05/21 at 2:26 PM

## 2021-08-05 NOTE — PROGRESS NOTES
Call from pt stating she saw DANIEL Pollard 8/3/21 and was started on Amlodipine 2.5mg every evening. Pt took Amlodipine yesterday evening for first time. At 0130 this morning she had chest pain. It woke her from sleep. Reports pain was in middle of chest between breast bones. Got up and went to bathroom and went back to bed. Pain was still there when she went back to sleep. Woke up again 0345 with chest pain again. Describes pain as sharp and constant. She got up and went to chair at 0345 and took 1 nitroglycerin. After 3-4 minutes pain resolved and has not had any chest pain since then. Pt reports her BP this am just before 10am was 169/83, this was prior to am medications. Pt denies shortness of breath with episode that she can recall. Felt a little dizzy, but wasn't sure. She made it to the bathroom and back without issue. Pt wondering if she should take the Amlodipine or not tonight. Asked pt if this pain is anything like she has had before. She reports it is similar to pain she has had in the past, but has not had it for sometime. Pt reports she was on Amlodipine years ago, and it was stopped but she cannot remember why. It is not listed on allergy list. Searched chart, no mention of why Amlodipine was stopped, but looks like it was stopped in 2015 for unknown reason.     Pt has hx of mild nonobstructive CAD. DANIEL Pollard and Dr. Knapp out of office until next week. Will message another provider to review.     Yenny Avila RN, BSN  08/05/21 at 1:15 PM

## 2021-08-05 NOTE — TELEPHONE ENCOUNTER
She can continue to take amlodipine, however she should take it in the morning.  Just in case she gets another reaction, it would be during daytime hours and she could call the clinic if needed.  It seems unlikely that amlodipine would cause any chest pain.    Duane

## 2021-08-11 ENCOUNTER — TELEPHONE (OUTPATIENT)
Dept: CARDIOLOGY | Facility: CLINIC | Age: 86
End: 2021-08-11

## 2021-08-11 DIAGNOSIS — R00.2 PALPITATIONS: Primary | ICD-10-CM

## 2021-08-11 NOTE — TELEPHONE ENCOUNTER
"Patient was seen by June Santana 8-3-31  per Dr. Knapp for follow up.  BP elevated and started on 2.5mg of amlodipine on Coreg 25mg BID, Hydralazine 25mg QID and Lasix 120mg daily.  She complains of shortness of breath and her\" heart is pounding so hard in her ears\".  8-5-21 patient called with chest pain  Dr. Carl reviewed symptoms and recommended patient take the amlodipine in am. (Both June Santana and Dr. Knapp out of office)    Patient does not want to take the medication and feels that she was on something similar in the past and had to stop it.  Will forward to Dr. Knapp to review ad advise. Patient has follow up with June Santana 9-12-21.  "

## 2021-08-12 RX ORDER — METOPROLOL TARTRATE 100 MG
100 TABLET ORAL 2 TIMES DAILY
Qty: 60 TABLET | Refills: 3 | Status: SHIPPED | OUTPATIENT
Start: 2021-08-12 | End: 2021-12-06

## 2021-08-12 NOTE — TELEPHONE ENCOUNTER
Contacted patient and she has agreed to try metoprolol 100mg BID and will let us know in 1-2 weeks how she is feeling.  RX sent to [facility] in [place]       Response from Dr. Knapp:  We can try changing her coreg to metoprolol 100mg BID or consider seeing EP

## 2021-08-31 ENCOUNTER — TRANSFERRED RECORDS (OUTPATIENT)
Dept: HEALTH INFORMATION MANAGEMENT | Facility: CLINIC | Age: 86
End: 2021-08-31

## 2021-09-10 NOTE — PROGRESS NOTES
HISTORY OF PRESENT ILLNESS:     This is a 86 year old female who follows with Dr. Knapp at [facility]   Her past medical history includes:  Hypertension, coronary artery disease, sleep apnea, mixed hyperlipidemia, CKD, morbid obesity, and peripheral neuropathy with frequent falling.    Ms Barrios has a long history of treated hypertension mainly managed by her PMD  Her activity is limited due to her peripheral neuropathy.     CT coronary angiogram (2011) showed nonobstructive coronary artery disease.      ECHO (2018) showed LVEF 60-65%, grade I diastolic dysfunction, normal RV function, 1-2+ TR, RVSP 34 mmHg    A ZioPatch monitor (6/2021) due to complaints of palpitations.  This showed SR with bundle branch block averaging at 68 bpm.  She did have several brief SVT episodes and 2.2% PAC burden.  No A-fib    She was started on Amlodipine last month due to ongoing hypertension  However, she had self-stopped it shortly after taking it due to intolerance and palpitations. Instead, Dr. Knapp changed her Coreg to Metoprolol  Due to her chronic leg edema, she was referred to the lymphedema clinic  .    Our visit today is for further review    Ms Barrios comes in with her daughter today who assists with her history and symptoms. She has noted improved blood pressures since she has been taking Metoprolol. She now has a new CPAP machine and is sleeping better. She states that she has some audible hallucinations when she takes gabapentin or melatonin and therefore avoids them.  She has not had any further chest pain  She has yet to go to the lymphedema clinic but will call them back. Her leg swelling improves some with leg elevation.  She cannot get the compression socks on.  She denies any significant palpitations, or orthopnea.        VITAL SIGNS:  BP: 124/62  Pulse: 61  Weight: not taken      IMPRESSION AND PLAN:    Palpitations:  -recent Ziopatch showed SR with multiple SVT episodes and PAC burden of 2.2%  -now on  Metoprolol 100 mg BID which has significantly improved her palpitations.  -continue current medical regimen for now  -could consider EP evaluation with more episodes    Hypertension:  -on Metoprolol 100 mg BID, Lasix 120 mg, Hydralazine 25 mg QID,  -BP controlled today    Chronic Lymphedema  -takes Lasix 120 mg/day total  -encouraged low salt diet and leg elevation  -recommend lymphedema clinic    Sleep Apnea  -uses CPAP    Hyperlipidemia:  -on Pravastatin 20 mg    Peripheral neuropathy  -limits her activity     The total time for the visit today was 30 minutes which includes patient visit, reviewing of records, discussion, and placing of orders of the outpatient coordination of cardiovascular care as described.  The level of medical decision making during this visit was of moderate complexity.  Thank you for allowing me to participate in their care.    Orders Placed This Encounter   Procedures     Follow-Up with Cardiac Advanced Practice Provider       Orders Placed This Encounter   Medications     hydrALAZINE (APRESOLINE) 25 MG tablet     Sig: Take 1 tablet (25 mg) by mouth 4 times daily     Dispense:  120 tablet     Refill:  3     nitroGLYcerin (NITROSTAT) 0.4 MG sublingual tablet     Sig: Place 1 tablet (0.4 mg) under the tongue every 5 minutes as needed for chest pain     Dispense:  25 tablet     Refill:  1       Medications Discontinued During This Encounter   Medication Reason     Vitamin D, Cholecalciferol, 1000 UNITS CAPS Medication Reconciliation Clean Up     hydrALAZINE (APRESOLINE) 25 MG tablet      nitroglycerin (NITROSTAT) 0.4 MG sublingual tablet Reorder         Encounter Diagnoses   Name Primary?     Essential hypertension      PHILLIP (obstructive sleep apnea)      Coronary artery disease involving native coronary artery of native heart without angina pectoris      Renal insufficiency      Morbid obesity, unspecified obesity type (H)      Palpitations Yes       CURRENT MEDICATIONS:  Current Outpatient  "Medications   Medication Sig Dispense Refill     acetaminophen (TYLENOL) 500 MG tablet Take 500-1,000 mg by mouth every 6 hours as needed for mild pain       ASPIRIN 81 MG OR TABS 1 TABLET DAILY, takes AFTERNOON       B-12 500 MCG OR TABS DAILY       CENTRUM SILVER OR DAILY       Cranberry 300 MG TABS Take by mouth daily       furosemide (LASIX) 40 MG tablet Take 1 tablet (40 mg) by mouth 2 times daily (Patient taking differently: Take 40 mg by mouth Take 80 mg in AM, take 40 mg in PM) 180 tablet 4     hydrALAZINE (APRESOLINE) 25 MG tablet Take 1 tablet (25 mg) by mouth 4 times daily 120 tablet 3     metoprolol tartrate (LOPRESSOR) 100 MG tablet Take 1 tablet (100 mg) by mouth 2 times daily 60 tablet 3     nitroGLYcerin (NITROSTAT) 0.4 MG sublingual tablet Place 1 tablet (0.4 mg) under the tongue every 5 minutes as needed for chest pain 25 tablet 1     pravastatin (PRAVACHOL) 20 MG tablet Take 1 tab by mouth daily in the evening 90 tablet 1       ALLERGIES     Allergies   Allergen Reactions     Neurontin [Gabapentin] Other (See Comments)     hallucinations     Aspirin      In large doses     Azithromycin Other (See Comments)     Chest pain     Cipro [Ciprofloxacin]      Chest pain     Irbesartan Unknown     Morphine Visual Disturbance     Pt. Stated \"i do not like  Morphine. It makes me feel goofy.\"     Pcn [Penicillins]      rash     Prinivil [Lisinopril]      cough     Sulfa Drugs Other (See Comments)     shakes  shakes       PAST MEDICAL HISTORY:  Past Medical History:   Diagnosis Date     CAD (coronary artery disease)     11/17/2011 Lexiscan Nuclear study - mild anterior/apical ischemia (LAD), 11/2011 CT cor angio - nonobstructive CAD, Calcium score 0     Cancer (H)     Basal cell face     CKD (chronic kidney disease)      Frequent UTI      Hyperlipidemia      Hypertension      Neuropathy     secondary to low back DJD     Obese      PHILLIP (obstructive sleep apnea)      Osteopenia      Pyelonephritis      " Thrombocytopenia (H)     ITP       PAST SURGICAL HISTORY:  Past Surgical History:   Procedure Laterality Date     APPENDECTOMY       CATARACT IOL, RT/LT       CHOLECYSTECTOMY       ENT SURGERY      basal cell     ESOPHAGOSCOPY, GASTROSCOPY, DUODENOSCOPY (EGD), COMBINED N/A 10/6/2014    Procedure: COMBINED ESOPHAGOSCOPY, GASTROSCOPY, DUODENOSCOPY (EGD);  Surgeon: Rell Torres MD;  Location:  GI     GYN SURGERY      tubal ligation, hysterectomy     ORTHOPEDIC SURGERY      jaw surgery, spine stenosis - decompression L 3-5 surgery       FAMILY HISTORY:  Family History   Problem Relation Age of Onset     Breast Cancer Daughter      Heart Disease Sister      C.A.D. Maternal Grandfather      Cancer - colorectal Brother        SOCIAL HISTORY:  Social History     Socioeconomic History     Marital status:      Spouse name: None     Number of children: None     Years of education: None     Highest education level: None   Occupational History     None   Tobacco Use     Smoking status: Former Smoker     Quit date: 1970     Years since quittin.6     Smokeless tobacco: Never Used   Substance and Sexual Activity     Alcohol use: Not Currently     Comment: once per month if that     Drug use: None     Sexual activity: Never   Other Topics Concern     Parent/sibling w/ CABG, MI or angioplasty before 65F 55M? No      Service Not Asked     Blood Transfusions Not Asked     Caffeine Concern No     Occupational Exposure Not Asked     Hobby Hazards Not Asked     Sleep Concern Yes     Comment: sleep apnea, wears cpap at night     Stress Concern No     Weight Concern No     Special Diet Yes     Comment: low sodium     Back Care Not Asked     Exercise No     Bike Helmet Not Asked     Seat Belt Yes     Self-Exams Not Asked   Social History Narrative    She lives in a condo currently in [place]             Social Determinants of Health     Financial Resource Strain:      Difficulty of Paying Living  Expenses:    Food Insecurity:      Worried About Running Out of Food in the Last Year:      Ran Out of Food in the Last Year:    Transportation Needs:      Lack of Transportation (Medical):      Lack of Transportation (Non-Medical):    Physical Activity:      Days of Exercise per Week:      Minutes of Exercise per Session:    Stress:      Feeling of Stress :    Social Connections:      Frequency of Communication with Friends and Family:      Frequency of Social Gatherings with Friends and Family:      Attends Catholic Services:      Active Member of Clubs or Organizations:      Attends Club or Organization Meetings:      Marital Status:    Intimate Partner Violence:      Fear of Current or Ex-Partner:      Emotionally Abused:      Physically Abused:      Sexually Abused:        Review of Systems:  Skin:  Negative       Eyes:  Positive for glasses macular degeneration; reading glasses  ENT:  Positive for hearing loss    Respiratory:  Positive for sleep apnea;CPAP;dyspnea on exertion     Cardiovascular:    lightheadedness;chest pain;Positive for;edema swellen all over  Gastroenterology: Positive for reflux    Genitourinary:  Positive for urinary frequency    Musculoskeletal:  Positive for neck pain;back pain;arthritis;joint pain;nocturnal cramping knees  Neurologic:  Positive for numbness or tingling of hands;numbness or tingling of feet finger tips, lower legs, neuropathy  Psychiatric:  Negative      Heme/Lymph/Imm:  Negative      Endocrine:  Negative        Physical Exam:  Vitals: /62 (BP Location: Right arm, Patient Position: Sitting, Cuff Size: Adult Regular)   Pulse 61   SpO2 95%     Constitutional:  cooperative;in no acute distress morbidly obese      Skin:  warm and dry to the touch          Head:  normocephalic        Eyes:           Lymph:      ENT:  no pallor or cyanosis        Neck:  no carotid bruit        Respiratory:  clear to auscultation;normal symmetry         Cardiac: regular rhythm     no  presence of murmur            pulses below the femoral arteries are diminished                                      GI:  abdomen soft obese      Extremities and Muscular Skeletal:      bilateral LE edema;2+;1+     lower legs tender to touch    Neurological:  affect appropriate   gait imbalance  In wheelchair    Psych:  Alert and Oriented x 3          CC  June Santana, APRN CNP  [id number]  [place] [address]

## 2021-09-14 ENCOUNTER — OFFICE VISIT (OUTPATIENT)
Dept: CARDIOLOGY | Facility: CLINIC | Age: 86
End: 2021-09-14
Attending: NURSE PRACTITIONER
Payer: COMMERCIAL

## 2021-09-14 VITALS — SYSTOLIC BLOOD PRESSURE: 124 MMHG | DIASTOLIC BLOOD PRESSURE: 62 MMHG | OXYGEN SATURATION: 95 % | HEART RATE: 61 BPM

## 2021-09-14 DIAGNOSIS — G47.33 OSA (OBSTRUCTIVE SLEEP APNEA): ICD-10-CM

## 2021-09-14 DIAGNOSIS — N28.9 RENAL INSUFFICIENCY: ICD-10-CM

## 2021-09-14 DIAGNOSIS — E66.01 MORBID OBESITY, UNSPECIFIED OBESITY TYPE (H): ICD-10-CM

## 2021-09-14 DIAGNOSIS — I10 ESSENTIAL HYPERTENSION: ICD-10-CM

## 2021-09-14 DIAGNOSIS — R00.2 PALPITATIONS: Primary | ICD-10-CM

## 2021-09-14 DIAGNOSIS — I25.10 CORONARY ARTERY DISEASE INVOLVING NATIVE CORONARY ARTERY OF NATIVE HEART WITHOUT ANGINA PECTORIS: ICD-10-CM

## 2021-09-14 PROCEDURE — 99214 OFFICE O/P EST MOD 30 MIN: CPT | Performed by: NURSE PRACTITIONER

## 2021-09-14 RX ORDER — HYDRALAZINE HYDROCHLORIDE 25 MG/1
25 TABLET, FILM COATED ORAL 4 TIMES DAILY
Qty: 120 TABLET | Refills: 3 | Status: SHIPPED | OUTPATIENT
Start: 2021-09-14 | End: 2022-03-11

## 2021-09-14 RX ORDER — NITROGLYCERIN 0.4 MG/1
0.4 TABLET SUBLINGUAL EVERY 5 MIN PRN
Qty: 25 TABLET | Refills: 1 | Status: SHIPPED | OUTPATIENT
Start: 2021-09-14

## 2021-09-14 NOTE — LETTER
9/14/2021    Nga Conde MD, MD  [facility] [address]  [facility] [address]    RE: Karli Barrios       Dear Colleague,    I had the pleasure of seeing Karli Barrios in the [facility].    HISTORY OF PRESENT ILLNESS:     This is a 86 year old female who follows with Dr. Knapp at [facility]   Her past medical history includes:  Hypertension, coronary artery disease, sleep apnea, mixed hyperlipidemia, CKD, morbid obesity, and peripheral neuropathy with frequent falling.    Ms Barrios has a long history of treated hypertension mainly managed by her PMD  Her activity is limited due to her peripheral neuropathy.     CT coronary angiogram (2011) showed nonobstructive coronary artery disease.      ECHO (2018) showed LVEF 60-65%, grade I diastolic dysfunction, normal RV function, 1-2+ TR, RVSP 34 mmHg    A ZioPatch monitor (6/2021) due to complaints of palpitations.  This showed SR with bundle branch block averaging at 68 bpm.  She did have several brief SVT episodes and 2.2% PAC burden.  No A-fib    She was started on Amlodipine last month due to ongoing hypertension  However, she had self-stopped it shortly after taking it due to intolerance and palpitations. Instead, Dr. Knapp changed her Coreg to Metoprolol  Due to her chronic leg edema, she was referred to the lymphedema clinic  .    Our visit today is for further review    Ms Barrios comes in with her daughter today who assists with her history and symptoms. She has noted improved blood pressures since she has been taking Metoprolol. She now has a new CPAP machine and is sleeping better. She states that she has some audible hallucinations when she takes gabapentin or melatonin and therefore avoids them.  She has not had any further chest pain  She has yet to go to the lymphedema clinic but will call them back. Her leg swelling improves some with leg elevation.  She cannot  get the compression socks on.  She denies any significant palpitations, or orthopnea.        VITAL SIGNS:  BP: 124/62  Pulse: 61  Weight: not taken      IMPRESSION AND PLAN:    Palpitations:  -recent Ziopatch showed SR with multiple SVT episodes and PAC burden of 2.2%  -now on Metoprolol 100 mg BID which has significantly improved her palpitations.  -continue current medical regimen for now  -could consider EP evaluation with more episodes    Hypertension:  -on Metoprolol 100 mg BID, Lasix 120 mg, Hydralazine 25 mg QID,  -BP controlled today    Chronic Lymphedema  -takes Lasix 120 mg/day total  -encouraged low salt diet and leg elevation  -recommend lymphedema clinic    Sleep Apnea  -uses CPAP    Hyperlipidemia:  -on Pravastatin 20 mg    Peripheral neuropathy  -limits her activity     The total time for the visit today was 30 minutes which includes patient visit, reviewing of records, discussion, and placing of orders of the outpatient coordination of cardiovascular care as described.  The level of medical decision making during this visit was of moderate complexity.  Thank you for allowing me to participate in their care.    Orders Placed This Encounter   Procedures     Follow-Up with Cardiac Advanced Practice Provider       Orders Placed This Encounter   Medications     hydrALAZINE (APRESOLINE) 25 MG tablet     Sig: Take 1 tablet (25 mg) by mouth 4 times daily     Dispense:  120 tablet     Refill:  3     nitroGLYcerin (NITROSTAT) 0.4 MG sublingual tablet     Sig: Place 1 tablet (0.4 mg) under the tongue every 5 minutes as needed for chest pain     Dispense:  25 tablet     Refill:  1       Medications Discontinued During This Encounter   Medication Reason     Vitamin D, Cholecalciferol, 1000 UNITS CAPS Medication Reconciliation Clean Up     hydrALAZINE (APRESOLINE) 25 MG tablet      nitroglycerin (NITROSTAT) 0.4 MG sublingual tablet Reorder         Encounter Diagnoses   Name Primary?     Essential hypertension       "PHILLIP (obstructive sleep apnea)      Coronary artery disease involving native coronary artery of native heart without angina pectoris      Renal insufficiency      Morbid obesity, unspecified obesity type (H)      Palpitations Yes       CURRENT MEDICATIONS:  Current Outpatient Medications   Medication Sig Dispense Refill     acetaminophen (TYLENOL) 500 MG tablet Take 500-1,000 mg by mouth every 6 hours as needed for mild pain       ASPIRIN 81 MG OR TABS 1 TABLET DAILY, takes AFTERNOON       B-12 500 MCG OR TABS DAILY       CENTRUM SILVER OR DAILY       Cranberry 300 MG TABS Take by mouth daily       furosemide (LASIX) 40 MG tablet Take 1 tablet (40 mg) by mouth 2 times daily (Patient taking differently: Take 40 mg by mouth Take 80 mg in AM, take 40 mg in PM) 180 tablet 4     hydrALAZINE (APRESOLINE) 25 MG tablet Take 1 tablet (25 mg) by mouth 4 times daily 120 tablet 3     metoprolol tartrate (LOPRESSOR) 100 MG tablet Take 1 tablet (100 mg) by mouth 2 times daily 60 tablet 3     nitroGLYcerin (NITROSTAT) 0.4 MG sublingual tablet Place 1 tablet (0.4 mg) under the tongue every 5 minutes as needed for chest pain 25 tablet 1     pravastatin (PRAVACHOL) 20 MG tablet Take 1 tab by mouth daily in the evening 90 tablet 1       ALLERGIES     Allergies   Allergen Reactions     Neurontin [Gabapentin] Other (See Comments)     hallucinations     Aspirin      In large doses     Azithromycin Other (See Comments)     Chest pain     Cipro [Ciprofloxacin]      Chest pain     Irbesartan Unknown     Morphine Visual Disturbance     Pt. Stated \"i do not like  Morphine. It makes me feel goofy.\"     Pcn [Penicillins]      rash     Prinivil [Lisinopril]      cough     Sulfa Drugs Other (See Comments)     liz hill       PAST MEDICAL HISTORY:  Past Medical History:   Diagnosis Date     CAD (coronary artery disease)     11/17/2011 Lexiscan Nuclear study - mild anterior/apical ischemia (LAD), 11/2011 CT cor angio - nonobstructive CAD, " Calcium score 0     Cancer (H)     Basal cell face     CKD (chronic kidney disease)      Frequent UTI      Hyperlipidemia      Hypertension      Neuropathy     secondary to low back DJD     Obese      PHILLIP (obstructive sleep apnea)      Osteopenia      Pyelonephritis      Thrombocytopenia (H)     ITP       PAST SURGICAL HISTORY:  Past Surgical History:   Procedure Laterality Date     APPENDECTOMY       CATARACT IOL, RT/LT       CHOLECYSTECTOMY       ENT SURGERY      basal cell     ESOPHAGOSCOPY, GASTROSCOPY, DUODENOSCOPY (EGD), COMBINED N/A 10/6/2014    Procedure: COMBINED ESOPHAGOSCOPY, GASTROSCOPY, DUODENOSCOPY (EGD);  Surgeon: Rell Torres MD;  Location:  GI     GYN SURGERY      tubal ligation, hysterectomy     ORTHOPEDIC SURGERY      jaw surgery, spine stenosis - decompression L 3-5 surgery       FAMILY HISTORY:  Family History   Problem Relation Age of Onset     Breast Cancer Daughter      Heart Disease Sister      C.A.D. Maternal Grandfather      Cancer - colorectal Brother        SOCIAL HISTORY:  Social History     Socioeconomic History     Marital status:      Spouse name: None     Number of children: None     Years of education: None     Highest education level: None   Occupational History     None   Tobacco Use     Smoking status: Former Smoker     Quit date: 1970     Years since quittin.6     Smokeless tobacco: Never Used   Substance and Sexual Activity     Alcohol use: Not Currently     Comment: once per month if that     Drug use: None     Sexual activity: Never   Other Topics Concern     Parent/sibling w/ CABG, MI or angioplasty before 65F 55M? No      Service Not Asked     Blood Transfusions Not Asked     Caffeine Concern No     Occupational Exposure Not Asked     Hobby Hazards Not Asked     Sleep Concern Yes     Comment: sleep apnea, wears cpap at night     Stress Concern No     Weight Concern No     Special Diet Yes     Comment: low sodium     Back Care Not Asked      Exercise No     Bike Helmet Not Asked     Seat Belt Yes     Self-Exams Not Asked   Social History Narrative    She lives in a condo currently in [place]             Social Determinants of Health     Financial Resource Strain:      Difficulty of Paying Living Expenses:    Food Insecurity:      Worried About Running Out of Food in the Last Year:      Ran Out of Food in the Last Year:    Transportation Needs:      Lack of Transportation (Medical):      Lack of Transportation (Non-Medical):    Physical Activity:      Days of Exercise per Week:      Minutes of Exercise per Session:    Stress:      Feeling of Stress :    Social Connections:      Frequency of Communication with Friends and Family:      Frequency of Social Gatherings with Friends and Family:      Attends Gnosticist Services:      Active Member of Clubs or Organizations:      Attends Club or Organization Meetings:      Marital Status:    Intimate Partner Violence:      Fear of Current or Ex-Partner:      Emotionally Abused:      Physically Abused:      Sexually Abused:        Review of Systems:  Skin:  Negative       Eyes:  Positive for glasses macular degeneration; reading glasses  ENT:  Positive for hearing loss    Respiratory:  Positive for sleep apnea;CPAP;dyspnea on exertion     Cardiovascular:    lightheadedness;chest pain;Positive for;edema swellen all over  Gastroenterology: Positive for reflux    Genitourinary:  Positive for urinary frequency    Musculoskeletal:  Positive for neck pain;back pain;arthritis;joint pain;nocturnal cramping knees  Neurologic:  Positive for numbness or tingling of hands;numbness or tingling of feet finger tips, lower legs, neuropathy  Psychiatric:  Negative      Heme/Lymph/Imm:  Negative      Endocrine:  Negative        Physical Exam:  Vitals: /62 (BP Location: Right arm, Patient Position: Sitting, Cuff Size: Adult Regular)   Pulse 61   SpO2 95%     Constitutional:  cooperative;in no acute distress morbidly  obese      Skin:  warm and dry to the touch          Head:  normocephalic        Eyes:           Lymph:      ENT:  no pallor or cyanosis        Neck:  no carotid bruit        Respiratory:  clear to auscultation;normal symmetry         Cardiac: regular rhythm     no presence of murmur            pulses below the femoral arteries are diminished                                      GI:  abdomen soft obese      Extremities and Muscular Skeletal:      bilateral LE edema;2+;1+     lower legs tender to touch    Neurological:  affect appropriate   gait imbalance  In wheelchair    Psych:  Alert and Oriented x 3          CC  HARPREET Ozuna CNP  [address]  [place] [address]                      Thank you for allowing me to participate in the care of your patient.      Sincerely,     HARPREET Goddard CNP     [facility]  cc:   HARPREET Ozuna CNP  [address]  [place] [id number]

## 2021-12-06 DIAGNOSIS — R00.2 PALPITATIONS: ICD-10-CM

## 2021-12-06 RX ORDER — METOPROLOL TARTRATE 100 MG
100 TABLET ORAL 2 TIMES DAILY
Qty: 180 TABLET | Refills: 1 | Status: SHIPPED | OUTPATIENT
Start: 2021-12-06 | End: 2022-07-25

## 2021-12-06 NOTE — TELEPHONE ENCOUNTER
Medication Refilled: Metoprolol   Last office visit: 9/14/21 with June Santana CNP  Last Labs/EKG: NA  Next office visit: 3/2022 orders in [facility]   Pharmacy sent to: Bruno Malhotra RN

## 2022-03-11 DIAGNOSIS — I10 ESSENTIAL HYPERTENSION: ICD-10-CM

## 2022-03-11 RX ORDER — HYDRALAZINE HYDROCHLORIDE 25 MG/1
25 TABLET, FILM COATED ORAL 4 TIMES DAILY
Qty: 120 TABLET | Refills: 0 | Status: SHIPPED | OUTPATIENT
Start: 2022-03-11 | End: 2023-01-01

## 2022-05-23 NOTE — PROGRESS NOTES
"HISTORY OF PRESENT ILLNESS:     This is a 87 year old female who follows with Dr. Knapp at [facility]   Her past medical history includes:  Hypertension, coronary artery disease, sleep apnea, mixed hyperlipidemia, CKD, morbid obesity, and peripheral neuropathy with frequent falling.     Ms Barrios has a long history of treated hypertension mainly managed by her PMD  Her activity is limited due to her peripheral neuropathy.     CT coronary angiogram (2011) showed nonobstructive coronary artery disease.      ECHO (2018) showed LVEF 60-65%, grade I diastolic dysfunction, normal RV function, 1-2+ TR, RVSP 34 mmHg     She developed palpitations and \"heart racing\" symptoms last year  A ZioPatch monitor (6/2021)  showed SR with bundle branch block averaging at 68 bpm.  She did have several brief SVT episodes and 2.2% PAC burden.  No A-fib  Her symptoms improved with a higher dose of Metoprolol.    She has a long history of significant leg edema on Lasix  She was referred to the lymphedema clinic last year, but not done.    She was hospitalized (4/5/22) for leg weakness from lower back issues and received a spine injection.  There, she was treated for a UTI and given IV lasix for fluid overload   Her CXR did not show any concern for CHF. She went to a TCU for a period of time  Her hydralazine dose was lowered, Lisinopril added, and she was discharged on her usual dose of Furosemide 120 mg/day.  She was also started on a sleeping aide.  She saw her PMD yesterday and her Lisinopril was stopped due to some renal insufficiency  She is back on a antibiotic for recurrence of her UTI.  I reviewed these records in Care Everywhere        Our visit today is for further review    Ms Barrios comes in with her daughter today who assists with her history and symptoms  She is mainly using a wheel chair to get around, but is working with PT and OT who come to her house  She states that her leg swelling has significantly improved " and has been stable now since she has been home  She checks her blood pressure at home and states that her systolic BP is staying < 140 mmHg.  She states that she is sleeping much more soundly with the Seroquel.  She had an episode of left-sided chest pain last night when she awoke.  She noted that her CPAP mask was off and the pain went away fairly quickly after reapplying her mask  She has not had this happen before  She denies any chest pain or shortness of breath during the daytime.  She has some brief palpitations at night, but these last only seconds They have improved with the Metoprolol  She denies any prolonged palpitations.       Labs at PMD (5/24/22):  Sodium: 141  Potassium: 3.4  BUN: 18  Creatinine: 0.88  Magnesium: 2.2  Hgb: 14     VITAL SIGNS:  BP: 137/69  Pulse:  56  Weight: not obtained and does not weigh at home        IMPRESSION AND PLAN:     Symptomatic PSVT, PACs  -Ziopatch (2021) showed SR with multiple SVT episodes and PAC burden of 2.2%  -higher dose of Metoprolol improved her palpitations    Nonobstructive Coronary Artery Disease  -brief atypical CP last PM when CPAP mask off.  She will notify our clinic with more symptoms at which point we would consider repeat stress test     Hypertension:  -on Metoprolol 100 mg BID, Lasix 120 mg, Hydralazine 25 mg BID,  -BP controlled   -she will call with SBP > 140 mmHg at which point we would increase her Hydralazine dose.  Recent intolerance to Lisinopril (renal insufficiency)    Chronic Lymphedema  -takes Lasix 120 mg/day total  -much improved     Sleep Apnea  -uses CPAP and now on Seroquel     Hyperlipidemia:  -on Pravastatin 20 mg  -managed through PMD     Peripheral neuropathy  -limits her activity     The total time for the visit today was 35 minutes which includes patient visit, reviewing of records, discussion, and placing of orders of the outpatient coordination of cardiovascular care as described.  The level of medical decision making during  this visit was of moderate/high complexity.  Thank you for allowing me to participate in their care.    Follow up with Dr Knapp in 6 months with BMP    Orders Placed This Encounter   Procedures     Basic metabolic panel     Follow-Up with Cardiology       Orders Placed This Encounter   Medications     QUEtiapine (SEROQUEL) 25 MG tablet     Sig: Take 12.5 mg by mouth At Bedtime       There are no discontinued medications.      Encounter Diagnoses   Name Primary?     Essential hypertension      Palpitations        CURRENT MEDICATIONS:  Current Outpatient Medications   Medication Sig Dispense Refill     acetaminophen (TYLENOL) 500 MG tablet Take 500-1,000 mg by mouth every 6 hours as needed for mild pain       ASPIRIN 81 MG OR TABS 1 TABLET DAILY, takes AFTERNOON       B-12 500 MCG OR TABS DAILY       CENTRUM SILVER OR DAILY       Cranberry 300 MG TABS Take by mouth daily       furosemide (LASIX) 40 MG tablet Take 1 tablet (40 mg) by mouth 2 times daily (Patient taking differently: Take 40 mg by mouth 2 times daily 2 tablets in the morning, 1 tablet in the eveing) 180 tablet 4     hydrALAZINE (APRESOLINE) 25 MG tablet Take 1 tablet (25 mg) by mouth 4 times daily (Patient taking differently: Take 25 mg by mouth 2 times daily) 120 tablet 0     metoprolol tartrate (LOPRESSOR) 100 MG tablet Take 1 tablet (100 mg) by mouth 2 times daily 180 tablet 1     nitroGLYcerin (NITROSTAT) 0.4 MG sublingual tablet Place 1 tablet (0.4 mg) under the tongue every 5 minutes as needed for chest pain 25 tablet 1     pravastatin (PRAVACHOL) 20 MG tablet Take 1 tab by mouth daily in the evening 90 tablet 1     QUEtiapine (SEROQUEL) 25 MG tablet Take 12.5 mg by mouth At Bedtime         ALLERGIES     Allergies   Allergen Reactions     Neurontin [Gabapentin] Other (See Comments)     hallucinations     Aspirin      In large doses     Azithromycin Other (See Comments)     Chest pain     Cipro [Ciprofloxacin]      Chest pain     Irbesartan Unknown  "    Morphine Visual Disturbance     Pt. Stated \"i do not like  Morphine. It makes me feel goofy.\"     Pcn [Penicillins]      rash     Prinivil [Lisinopril]      cough     Sulfa Drugs Other (See Comments)     shakes  shakes       PAST MEDICAL HISTORY:  Past Medical History:   Diagnosis Date     CAD (coronary artery disease)     2011 Lexiscan Nuclear study - mild anterior/apical ischemia (LAD), 2011 CT cor angio - nonobstructive CAD, Calcium score 0     Cancer (H)     Basal cell face     CKD (chronic kidney disease)      Frequent UTI      Hyperlipidemia      Hypertension      Neuropathy     secondary to low back DJD     Obese      PHILLIP (obstructive sleep apnea)      Osteopenia      Pyelonephritis      Thrombocytopenia (H)     ITP       PAST SURGICAL HISTORY:  Past Surgical History:   Procedure Laterality Date     APPENDECTOMY       CATARACT IOL, RT/LT       CHOLECYSTECTOMY       ENT SURGERY      basal cell     ESOPHAGOSCOPY, GASTROSCOPY, DUODENOSCOPY (EGD), COMBINED N/A 10/6/2014    Procedure: COMBINED ESOPHAGOSCOPY, GASTROSCOPY, DUODENOSCOPY (EGD);  Surgeon: Rell Torres MD;  Location:  GI     GYN SURGERY      tubal ligation, hysterectomy     ORTHOPEDIC SURGERY      jaw surgery, spine stenosis - decompression L 3-5 surgery       FAMILY HISTORY:  Family History   Problem Relation Age of Onset     Breast Cancer Daughter      Heart Disease Sister      C.A.D. Maternal Grandfather      Cancer - colorectal Brother        SOCIAL HISTORY:  Social History     Socioeconomic History     Marital status:    Tobacco Use     Smoking status: Former Smoker     Quit date: 1970     Years since quittin.3     Smokeless tobacco: Never Used   Substance and Sexual Activity     Alcohol use: Not Currently     Comment: once per month if that     Sexual activity: Never   Other Topics Concern     Parent/sibling w/ CABG, MI or angioplasty before 65F 55M? No     Caffeine Concern No     Sleep Concern Yes     " "Comment: sleep apnea, wears cpap at night     Stress Concern No     Weight Concern No     Special Diet Yes     Comment: low sodium     Exercise No     Seat Belt Yes   Social History Narrative    She lives in a condo currently in [place]               Review of Systems:  Skin:  not assessed       Eyes:  not assessed      ENT:  not assessed      Respiratory:  Positive for sleep apnea;CPAP;dyspnea on exertion     Cardiovascular:    lightheadedness;chest pain;Positive for;edema    Gastroenterology: not assessed      Genitourinary:  not assessed      Musculoskeletal:  not assessed      Neurologic:  not assessed      Psychiatric:  not assessed      Heme/Lymph/Imm:  not assessed      Endocrine:  not assessed        Physical Exam:  Vitals: /69 (BP Location: Right arm, Patient Position: Sitting, Cuff Size: Adult Regular)   Pulse 56   Ht 1.651 m (5' 5\")   SpO2 94%   BMI 41.60 kg/m      Constitutional:  cooperative;in no acute distress morbidly obese      Skin:  warm and dry to the touch          Head:  normocephalic        Eyes:           Lymph:      ENT:  no pallor or cyanosis        Neck:  no carotid bruit        Respiratory:  clear to auscultation;normal symmetry         Cardiac: regular rhythm     no presence of murmur            pulses below the femoral arteries are diminished                                      GI:  abdomen soft obese      Extremities and Muscular Skeletal:      bilateral LE edema;1+          Neurological:  affect appropriate   In wheelchair    Psych:  Alert and Oriented x 3          CC  HARPREET Ozuna CNP  [id number]  [place] [address]                  "

## 2022-05-25 ENCOUNTER — OFFICE VISIT (OUTPATIENT)
Dept: CARDIOLOGY | Facility: CLINIC | Age: 87
End: 2022-05-25
Attending: NURSE PRACTITIONER
Payer: COMMERCIAL

## 2022-05-25 VITALS
SYSTOLIC BLOOD PRESSURE: 137 MMHG | DIASTOLIC BLOOD PRESSURE: 69 MMHG | OXYGEN SATURATION: 94 % | HEIGHT: 65 IN | HEART RATE: 56 BPM | BODY MASS INDEX: 41.6 KG/M2

## 2022-05-25 DIAGNOSIS — I10 ESSENTIAL HYPERTENSION: ICD-10-CM

## 2022-05-25 DIAGNOSIS — R00.2 PALPITATIONS: ICD-10-CM

## 2022-05-25 PROCEDURE — 99214 OFFICE O/P EST MOD 30 MIN: CPT | Performed by: NURSE PRACTITIONER

## 2022-05-25 RX ORDER — QUETIAPINE FUMARATE 25 MG/1
12.5 TABLET, FILM COATED ORAL AT BEDTIME
COMMUNITY
Start: 2022-05-10 | End: 2023-01-01

## 2022-05-25 NOTE — LETTER
"5/25/2022    Nga Conde MD, MD  [facility] [address]  [facility] [address]    RE: Karli Barrios       Dear Colleague,     I had the pleasure of seeing Karli Barrios in the [facility].  HISTORY OF PRESENT ILLNESS:     This is a 87 year old female who follows with Dr. Knapp at [facility]   Her past medical history includes:  Hypertension, coronary artery disease, sleep apnea, mixed hyperlipidemia, CKD, morbid obesity, and peripheral neuropathy with frequent falling.     Ms Barrios has a long history of treated hypertension mainly managed by her PMD  Her activity is limited due to her peripheral neuropathy.     CT coronary angiogram (2011) showed nonobstructive coronary artery disease.      ECHO (2018) showed LVEF 60-65%, grade I diastolic dysfunction, normal RV function, 1-2+ TR, RVSP 34 mmHg     She developed palpitations and \"heart racing\" symptoms last year  A ZioPatch monitor (6/2021)  showed SR with bundle branch block averaging at 68 bpm.  She did have several brief SVT episodes and 2.2% PAC burden.  No A-fib  Her symptoms improved with a higher dose of Metoprolol.    She has a long history of significant leg edema on Lasix  She was referred to the lymphedema clinic last year, but not done.    She was hospitalized (4/5/22) for leg weakness from lower back issues and received a spine injection.  There, she was treated for a UTI and given IV lasix for fluid overload   Her CXR did not show any concern for CHF. She went to a TCU for a period of time  Her hydralazine dose was lowered, Lisinopril added, and she was discharged on her usual dose of Furosemide 120 mg/day.  She was also started on a sleeping aide.  She saw her PMD yesterday and her Lisinopril was stopped due to some renal insufficiency  She is back on a antibiotic for recurrence of her UTI.  I reviewed these records in Care Everywhere        Our visit today is for further review    Ms Barrios " comes in with her daughter today who assists with her history and symptoms  She is mainly using a wheel chair to get around, but is working with PT and OT who come to her house  She states that her leg swelling has significantly improved and has been stable now since she has been home  She checks her blood pressure at home and states that her systolic BP is staying < 140 mmHg.  She states that she is sleeping much more soundly with the Seroquel.  She had an episode of left-sided chest pain last night when she awoke.  She noted that her CPAP mask was off and the pain went away fairly quickly after reapplying her mask  She has not had this happen before  She denies any chest pain or shortness of breath during the daytime.  She has some brief palpitations at night, but these last only seconds They have improved with the Metoprolol  She denies any prolonged palpitations.       Labs at PMD (5/24/22):  Sodium: 141  Potassium: 3.4  BUN: 18  Creatinine: 0.88  Magnesium: 2.2  Hgb: 14     VITAL SIGNS:  BP: 137/69  Pulse:  56  Weight: not obtained and does not weigh at home        IMPRESSION AND PLAN:     Symptomatic PSVT, PACs  -Ziopatch (2021) showed SR with multiple SVT episodes and PAC burden of 2.2%  -higher dose of Metoprolol improved her palpitations    Nonobstructive Coronary Artery Disease  -brief atypical CP last PM when CPAP mask off.  She will notify our clinic with more symptoms at which point we would consider repeat stress test     Hypertension:  -on Metoprolol 100 mg BID, Lasix 120 mg, Hydralazine 25 mg BID,  -BP controlled   -she will call with SBP > 140 mmHg at which point we would increase her Hydralazine dose.  Recent intolerance to Lisinopril (renal insufficiency)    Chronic Lymphedema  -takes Lasix 120 mg/day total  -much improved     Sleep Apnea  -uses CPAP and now on Seroquel     Hyperlipidemia:  -on Pravastatin 20 mg  -managed through PMD     Peripheral neuropathy  -limits her activity     The total  time for the visit today was 35 minutes which includes patient visit, reviewing of records, discussion, and placing of orders of the outpatient coordination of cardiovascular care as described.  The level of medical decision making during this visit was of moderate/high complexity.  Thank you for allowing me to participate in their care.    Follow up with Dr Knapp in 6 months with BMP    Orders Placed This Encounter   Procedures     Basic metabolic panel     Follow-Up with Cardiology       Orders Placed This Encounter   Medications     QUEtiapine (SEROQUEL) 25 MG tablet     Sig: Take 12.5 mg by mouth At Bedtime       There are no discontinued medications.      Encounter Diagnoses   Name Primary?     Essential hypertension      Palpitations        CURRENT MEDICATIONS:  Current Outpatient Medications   Medication Sig Dispense Refill     acetaminophen (TYLENOL) 500 MG tablet Take 500-1,000 mg by mouth every 6 hours as needed for mild pain       ASPIRIN 81 MG OR TABS 1 TABLET DAILY, takes AFTERNOON       B-12 500 MCG OR TABS DAILY       CENTRUM SILVER OR DAILY       Cranberry 300 MG TABS Take by mouth daily       furosemide (LASIX) 40 MG tablet Take 1 tablet (40 mg) by mouth 2 times daily (Patient taking differently: Take 40 mg by mouth 2 times daily 2 tablets in the morning, 1 tablet in the eveing) 180 tablet 4     hydrALAZINE (APRESOLINE) 25 MG tablet Take 1 tablet (25 mg) by mouth 4 times daily (Patient taking differently: Take 25 mg by mouth 2 times daily) 120 tablet 0     metoprolol tartrate (LOPRESSOR) 100 MG tablet Take 1 tablet (100 mg) by mouth 2 times daily 180 tablet 1     nitroGLYcerin (NITROSTAT) 0.4 MG sublingual tablet Place 1 tablet (0.4 mg) under the tongue every 5 minutes as needed for chest pain 25 tablet 1     pravastatin (PRAVACHOL) 20 MG tablet Take 1 tab by mouth daily in the evening 90 tablet 1     QUEtiapine (SEROQUEL) 25 MG tablet Take 12.5 mg by mouth At Bedtime         ALLERGIES    "  Allergies   Allergen Reactions     Neurontin [Gabapentin] Other (See Comments)     hallucinations     Aspirin      In large doses     Azithromycin Other (See Comments)     Chest pain     Cipro [Ciprofloxacin]      Chest pain     Irbesartan Unknown     Morphine Visual Disturbance     Pt. Stated \"i do not like  Morphine. It makes me feel goofy.\"     Pcn [Penicillins]      rash     Prinivil [Lisinopril]      cough     Sulfa Drugs Other (See Comments)     shakes  shakes       PAST MEDICAL HISTORY:  Past Medical History:   Diagnosis Date     CAD (coronary artery disease)     2011 Lexiscan Nuclear study - mild anterior/apical ischemia (LAD), 2011 CT cor angio - nonobstructive CAD, Calcium score 0     Cancer (H)     Basal cell face     CKD (chronic kidney disease)      Frequent UTI      Hyperlipidemia      Hypertension      Neuropathy     secondary to low back DJD     Obese      PHILLIP (obstructive sleep apnea)      Osteopenia      Pyelonephritis      Thrombocytopenia (H)     ITP       PAST SURGICAL HISTORY:  Past Surgical History:   Procedure Laterality Date     APPENDECTOMY       CATARACT IOL, RT/LT       CHOLECYSTECTOMY       ENT SURGERY      basal cell     ESOPHAGOSCOPY, GASTROSCOPY, DUODENOSCOPY (EGD), COMBINED N/A 10/6/2014    Procedure: COMBINED ESOPHAGOSCOPY, GASTROSCOPY, DUODENOSCOPY (EGD);  Surgeon: Rell Torres MD;  Location:  GI     GYN SURGERY      tubal ligation, hysterectomy     ORTHOPEDIC SURGERY      jaw surgery, spine stenosis - decompression L 3-5 surgery       FAMILY HISTORY:  Family History   Problem Relation Age of Onset     Breast Cancer Daughter      Heart Disease Sister      C.A.D. Maternal Grandfather      Cancer - colorectal Brother        SOCIAL HISTORY:  Social History     Socioeconomic History     Marital status:    Tobacco Use     Smoking status: Former Smoker     Quit date: 1970     Years since quittin.3     Smokeless tobacco: Never Used   Substance and " "Sexual Activity     Alcohol use: Not Currently     Comment: once per month if that     Sexual activity: Never   Other Topics Concern     Parent/sibling w/ CABG, MI or angioplasty before 65F 55M? No     Caffeine Concern No     Sleep Concern Yes     Comment: sleep apnea, wears cpap at night     Stress Concern No     Weight Concern No     Special Diet Yes     Comment: low sodium     Exercise No     Seat Belt Yes   Social History Narrative    She lives in a condo currently in [place]               Review of Systems:  Skin:  not assessed       Eyes:  not assessed      ENT:  not assessed      Respiratory:  Positive for sleep apnea;CPAP;dyspnea on exertion     Cardiovascular:    lightheadedness;chest pain;Positive for;edema    Gastroenterology: not assessed      Genitourinary:  not assessed      Musculoskeletal:  not assessed      Neurologic:  not assessed      Psychiatric:  not assessed      Heme/Lymph/Imm:  not assessed      Endocrine:  not assessed        Physical Exam:  Vitals: /69 (BP Location: Right arm, Patient Position: Sitting, Cuff Size: Adult Regular)   Pulse 56   Ht 1.651 m (5' 5\")   SpO2 94%   BMI 41.60 kg/m      Constitutional:  cooperative;in no acute distress morbidly obese      Skin:  warm and dry to the touch          Head:  normocephalic        Eyes:           Lymph:      ENT:  no pallor or cyanosis        Neck:  no carotid bruit        Respiratory:  clear to auscultation;normal symmetry         Cardiac: regular rhythm     no presence of murmur            pulses below the femoral arteries are diminished                                      GI:  abdomen soft obese      Extremities and Muscular Skeletal:      bilateral LE edema;1+          Neurological:  affect appropriate   In wheelchair    Psych:  Alert and Oriented x 3          CC  HARPREET Ozuna CNP  [id number]  [place] [address]        Thank you for allowing me to participate in the care of your patient.      Sincerely, "     HARPREET [facility]

## 2022-07-25 DIAGNOSIS — R00.2 PALPITATIONS: ICD-10-CM

## 2022-07-25 RX ORDER — METOPROLOL TARTRATE 100 MG
100 TABLET ORAL 2 TIMES DAILY
Qty: 180 TABLET | Refills: 3 | Status: SHIPPED | OUTPATIENT
Start: 2022-07-25 | End: 2023-01-01

## 2023-01-01 ENCOUNTER — VIRTUAL VISIT (OUTPATIENT)
Dept: CARDIOLOGY | Facility: CLINIC | Age: 88
End: 2023-01-01
Payer: COMMERCIAL

## 2023-01-01 ENCOUNTER — TELEPHONE (OUTPATIENT)
Dept: CARDIOLOGY | Facility: CLINIC | Age: 88
End: 2023-01-01

## 2023-01-01 ENCOUNTER — OFFICE VISIT (OUTPATIENT)
Dept: CARDIOLOGY | Facility: CLINIC | Age: 88
End: 2023-01-01
Attending: NURSE PRACTITIONER
Payer: COMMERCIAL

## 2023-01-01 ENCOUNTER — LAB (OUTPATIENT)
Dept: LAB | Facility: CLINIC | Age: 88
End: 2023-01-01
Payer: COMMERCIAL

## 2023-01-01 ENCOUNTER — TELEPHONE (OUTPATIENT)
Dept: CARDIOLOGY | Facility: CLINIC | Age: 88
End: 2023-01-01
Payer: COMMERCIAL

## 2023-01-01 VITALS
HEART RATE: 57 BPM | HEIGHT: 65 IN | DIASTOLIC BLOOD PRESSURE: 83 MMHG | OXYGEN SATURATION: 95 % | SYSTOLIC BLOOD PRESSURE: 173 MMHG | BODY MASS INDEX: 41.6 KG/M2

## 2023-01-01 DIAGNOSIS — I25.10 CORONARY ARTERY DISEASE INVOLVING NATIVE CORONARY ARTERY OF NATIVE HEART WITHOUT ANGINA PECTORIS: ICD-10-CM

## 2023-01-01 DIAGNOSIS — N28.9 RENAL INSUFFICIENCY: ICD-10-CM

## 2023-01-01 DIAGNOSIS — G47.33 OSA (OBSTRUCTIVE SLEEP APNEA): ICD-10-CM

## 2023-01-01 DIAGNOSIS — I10 ESSENTIAL HYPERTENSION: ICD-10-CM

## 2023-01-01 DIAGNOSIS — E66.01 MORBID OBESITY, UNSPECIFIED OBESITY TYPE (H): ICD-10-CM

## 2023-01-01 DIAGNOSIS — R00.2 PALPITATIONS: ICD-10-CM

## 2023-01-01 DIAGNOSIS — I89.0 LYMPHEDEMA: ICD-10-CM

## 2023-01-01 DIAGNOSIS — E78.2 MIXED HYPERLIPIDEMIA: ICD-10-CM

## 2023-01-01 DIAGNOSIS — I10 ESSENTIAL HYPERTENSION: Primary | ICD-10-CM

## 2023-01-01 DIAGNOSIS — R00.2 PALPITATIONS: Primary | ICD-10-CM

## 2023-01-01 LAB
ANION GAP SERPL CALCULATED.3IONS-SCNC: 13 MMOL/L (ref 7–15)
BUN SERPL-MCNC: 22.1 MG/DL (ref 8–23)
CALCIUM SERPL-MCNC: 9.6 MG/DL (ref 8.8–10.2)
CHLORIDE SERPL-SCNC: 101 MMOL/L (ref 98–107)
CREAT SERPL-MCNC: 0.89 MG/DL (ref 0.51–0.95)
DEPRECATED HCO3 PLAS-SCNC: 25 MMOL/L (ref 22–29)
GFR SERPL CREATININE-BSD FRML MDRD: 63 ML/MIN/1.73M2
GLUCOSE SERPL-MCNC: 116 MG/DL (ref 70–99)
POTASSIUM SERPL-SCNC: 3.5 MMOL/L (ref 3.4–5.3)
SODIUM SERPL-SCNC: 139 MMOL/L (ref 136–145)

## 2023-01-01 PROCEDURE — 99213 OFFICE O/P EST LOW 20 MIN: CPT | Mod: TEL | Performed by: INTERNAL MEDICINE

## 2023-01-01 PROCEDURE — 36415 COLL VENOUS BLD VENIPUNCTURE: CPT | Performed by: NURSE PRACTITIONER

## 2023-01-01 PROCEDURE — 80048 BASIC METABOLIC PNL TOTAL CA: CPT | Performed by: NURSE PRACTITIONER

## 2023-01-01 PROCEDURE — 99214 OFFICE O/P EST MOD 30 MIN: CPT | Performed by: INTERNAL MEDICINE

## 2023-01-01 RX ORDER — METOPROLOL TARTRATE 100 MG
100 TABLET ORAL 2 TIMES DAILY
Qty: 180 TABLET | Refills: 3 | Status: SHIPPED | OUTPATIENT
Start: 2023-01-01

## 2023-01-01 RX ORDER — HYDRALAZINE HYDROCHLORIDE 25 MG/1
25 TABLET, FILM COATED ORAL 2 TIMES DAILY
Qty: 90 TABLET | Refills: 6 | Status: SHIPPED | OUTPATIENT
Start: 2023-01-01

## 2023-01-01 RX ORDER — LOSARTAN POTASSIUM 25 MG/1
50 TABLET ORAL DAILY
COMMUNITY
Start: 2022-01-01 | End: 2023-01-01

## 2023-01-01 RX ORDER — PRAVASTATIN SODIUM 20 MG
TABLET ORAL
Qty: 90 TABLET | Refills: 3 | Status: SHIPPED | OUTPATIENT
Start: 2023-01-01

## 2023-01-01 RX ORDER — CARVEDILOL 25 MG/1
25 TABLET ORAL 2 TIMES DAILY WITH MEALS
Qty: 30 TABLET | Refills: 3 | Status: SHIPPED | OUTPATIENT
Start: 2023-01-01 | End: 2023-01-01

## 2023-01-01 RX ORDER — LOSARTAN POTASSIUM 25 MG/1
50 TABLET ORAL DAILY
Qty: 180 TABLET | Refills: 3 | Status: SHIPPED | OUTPATIENT
Start: 2023-01-01

## 2023-01-12 NOTE — PATIENT INSTRUCTIONS
WE ARE GOING TO GO BACK TO TAKING COREG (CARVEDILOL) BECAUSE IT CONTROLLED YOUR BLOOD PRESSURE BETTER.  WE SWITCHED TO METOPROLOL BECAUSE OF PALPITATIONS.  IF YOU HAVE RECURRENT FAST HR/PALPITATIONS, WE MAY NEED TO GO BACK TO METOPROLOL AND ADJUST THE HYDRALAZINE INSTEAD.  WE WILL TRY FOR ONE MONTH, GOING BACK TO CARVEDILOL    PUT METOPROLOL AWAY SO YOU ARE NOT TAKING BOTH, BUT DON'T THROW IT AWAY. WE MAY NEED TO GO BACK TO IT IF YOU HAVE RECURRENT PALPITATIONS.

## 2023-01-12 NOTE — PROGRESS NOTES
Service Date: 01/12/2023    REFERRING PROVIDER:  Nga Conde MD    HISTORY OF PRESENT ILLNESS:  Ms. Barrios is a pleasant 88-year-old female with a history of nonobstructive coronary artery disease, hypertension, obesity, sleep apnea and chronic kidney disease.  I have not seen her in about a year and a half.  She presents today with her daughter.  She is in a wheelchair due to gait instability and frequent falling.  She has been doing well over the past year without any major health issues.  It looks like back in 08/2021, we switched her carvedilol to metoprolol because she was having some palpitations.  Unfortunately, carvedilol controlled her blood pressure a little bit better.  She is quite high today.  She does measure it at home and it has been elevated as well.  We talked about blood pressure management today and different options including going back to carvedilol since it seemed to control her blood pressure a little bit better.    PHYSICAL EXAMINATION:    VITAL SIGNS:  Today, her blood pressure was measured at 173/83, pulse of 57.  She was unable to give her weight today.  CARDIOVASCULAR:  Tones were regular suggesting a normal sinus rhythm.    RESPIRATORY:  Lung fields are clear.    EXTREMITIES:  She does have mild peripheral edema.    IMPRESSION/PLAN:  In summary, Ms. Barrios is a pleasant 88-year-old female with:    1.  Nonobstructive coronary disease.  She is asymptomatic with this and is on a baby aspirin and Pravachol  2.  Hypertension, uncontrolled.  She did better on carvedilol with her blood pressure.  We talked about switching back to carvedilol, but she runs a risk of having recurrent palpitations.  The metoprolol had been working well for that.  She is willing to try it, so I gave her a prescription for 25 mg of carvedilol to take twice daily.  If she has recurrent palpitations, we will just have to go back to metoprolol and adjust 1 of her other antihypertensive medications.  I will see her back in  a month with a repeat blood pressure check.    Please feel free to contact me with any questions you have in regards to her care.    Ann Knapp DO    cc:  Nga Conde MD  [address], [address]    Ann Knapp DO        D: 2023   T: 2023   MT: judah    Name:     YASIR ROBLERO  MRN:      [id number]        Account:      [id number]   :      1935           Service Date: 2023       Document: [id number]

## 2023-01-12 NOTE — LETTER
1/12/2023    Nga Conde MD, MD  [facility] [address]  [facility] [address]    RE: Karli MATTA Sophie       Dear Colleague,     I had the pleasure of seeing Karli PAXTON Barrios in the [facility].  HPI and Plan:   See dictation    Today's clinic visit entailed:    31 minutes spent on the date of the encounter doing chart review, history and exam, documentation and further activities per the note  Provider  Link to MDM Help Grid           Orders Placed This Encounter   Procedures     Follow-Up with Cardiology       Orders Placed This Encounter   Medications     losartan (COZAAR) 25 MG tablet     Sig: Take 25 mg by mouth daily     hydrALAZINE (APRESOLINE) 25 MG tablet     Sig: Take 1 tablet (25 mg) by mouth 2 times daily     Dispense:  90 tablet     Refill:  6     carvedilol (COREG) 25 MG tablet     Sig: Take 1 tablet (25 mg) by mouth 2 times daily (with meals)     Dispense:  30 tablet     Refill:  3       Medications Discontinued During This Encounter   Medication Reason     metoprolol tartrate (LOPRESSOR) 100 MG tablet      hydrALAZINE (APRESOLINE) 25 MG tablet          Encounter Diagnoses   Name Primary?     Essential hypertension      Palpitations Yes     PHILLIP (obstructive sleep apnea)      Coronary artery disease involving native coronary artery of native heart without angina pectoris      Morbid obesity, unspecified obesity type (H)      Renal insufficiency      Lymphedema        CURRENT MEDICATIONS:  Current Outpatient Medications   Medication Sig Dispense Refill     acetaminophen (TYLENOL) 500 MG tablet Take 500-1,000 mg by mouth every 6 hours as needed for mild pain       ASPIRIN 81 MG OR TABS 1 TABLET DAILY, takes AFTERNOON       B-12 500 MCG OR TABS Take 500 mcg by mouth every 48 hours       carvedilol (COREG) 25 MG tablet Take 1 tablet (25 mg) by mouth 2 times daily (with meals) 30 tablet 3     CENTRUM SILVER OR Take 1 tablet by mouth daily       Cranberry 300 MG  "TABS Take 1 tablet by mouth daily       furosemide (LASIX) 40 MG tablet Take 1 tablet (40 mg) by mouth 2 times daily (Patient taking differently: Take 40 mg by mouth 2 times daily 2 tablets in the morning, 1 tablet in the evening) 180 tablet 4     hydrALAZINE (APRESOLINE) 25 MG tablet Take 1 tablet (25 mg) by mouth 2 times daily 90 tablet 6     losartan (COZAAR) 25 MG tablet Take 25 mg by mouth daily       nitroGLYcerin (NITROSTAT) 0.4 MG sublingual tablet Place 1 tablet (0.4 mg) under the tongue every 5 minutes as needed for chest pain 25 tablet 1     pravastatin (PRAVACHOL) 20 MG tablet Take 1 tab by mouth daily in the evening 90 tablet 1     QUEtiapine (SEROQUEL) 25 MG tablet Take 12.5 mg by mouth At Bedtime (Patient not taking: Reported on 1/12/2023)         ALLERGIES     Allergies   Allergen Reactions     Neurontin [Gabapentin] Other (See Comments)     hallucinations     Aspirin      In large doses     Azithromycin Other (See Comments)     Chest pain     Cipro [Ciprofloxacin]      Chest pain     Irbesartan Unknown     Morphine Visual Disturbance     Pt. Stated \"i do not like  Morphine. It makes me feel goofy.\"     Pcn [Penicillins]      rash     Prinivil [Lisinopril]      cough     Sulfa Drugs Other (See Comments)     shakes  shakes       PAST MEDICAL HISTORY:  Past Medical History:   Diagnosis Date     CAD (coronary artery disease)     11/17/2011 Lexiscan Nuclear study - mild anterior/apical ischemia (LAD), 11/2011 CT cor angio - nonobstructive CAD, Calcium score 0     Cancer (H)     Basal cell face     CKD (chronic kidney disease)      Frequent UTI      Hyperlipidemia      Hypertension      Neuropathy     secondary to low back DJD     Obese      PHILLIP (obstructive sleep apnea)      Osteopenia      Pyelonephritis      Thrombocytopenia (H)     ITP       PAST SURGICAL HISTORY:  Past Surgical History:   Procedure Laterality Date     APPENDECTOMY       CATARACT IOL, RT/LT  2005     CHOLECYSTECTOMY       ENT SURGERY  " "    basal cell     ESOPHAGOSCOPY, GASTROSCOPY, DUODENOSCOPY (EGD), COMBINED N/A 10/6/2014    Procedure: COMBINED ESOPHAGOSCOPY, GASTROSCOPY, DUODENOSCOPY (EGD);  Surgeon: Rell Torres MD;  Location:  GI     GYN SURGERY      tubal ligation, hysterectomy     ORTHOPEDIC SURGERY      jaw surgery, spine stenosis - decompression L 3-5 surgery       FAMILY HISTORY:  Family History   Problem Relation Age of Onset     Breast Cancer Daughter      Heart Disease Sister      C.A.D. Maternal Grandfather      Cancer - colorectal Brother        SOCIAL HISTORY:  Social History     Socioeconomic History     Marital status:    Tobacco Use     Smoking status: Former     Types: Cigarettes     Quit date: 1970     Years since quittin.9     Smokeless tobacco: Never   Substance and Sexual Activity     Alcohol use: Not Currently     Comment: once per month if that     Sexual activity: Never   Other Topics Concern     Parent/sibling w/ CABG, MI or angioplasty before 65F 55M? No     Caffeine Concern No     Sleep Concern Yes     Comment: sleep apnea, wears cpap at night     Stress Concern No     Weight Concern No     Special Diet Yes     Comment: low sodium     Exercise No     Seat Belt Yes   Social History Narrative    She lives in a condo currently in [place]               Review of Systems:  Skin:          Eyes:         ENT:         Respiratory:  Positive for sleep apnea;CPAP;dyspnea on exertion     Cardiovascular:    lightheadedness;Positive for;edema;dizziness Positional lightheadedness/dizziness  Gastroenterology:        Genitourinary:         Musculoskeletal:         Neurologic:         Psychiatric:         Heme/Lymph/Imm:         Endocrine:           Physical Exam:  Vitals: BP (!) 173/83 (BP Location: Right arm, Patient Position: Sitting, Cuff Size: Adult Regular)   Pulse 57   Ht 1.651 m (5' 5\")   SpO2 95%   BMI 41.60 kg/m      Constitutional:  cooperative;in no acute distress morbidly obese      Skin:  " warm and dry to the touch          Head:  normocephalic        Eyes:           Lymph:      ENT:  no pallor or cyanosis        Neck:  no carotid bruit        Respiratory:  clear to auscultation;normal symmetry         Cardiac: regular rhythm     no presence of murmur            pulses below the femoral arteries are diminished                                      GI:  abdomen soft obese      Extremities and Muscular Skeletal:      bilateral LE edema;1+          Neurological:  affect appropriate   In wheelchair    Psych:  Alert and Oriented x 3        CC  June Santana APRN CNP  [id number]  [place] [address]                Service Date: 01/12/2023    REFERRING PROVIDER:  Nga Conde MD    HISTORY OF PRESENT ILLNESS:  Ms. Barrios is a pleasant 88-year-old female with a history of nonobstructive coronary artery disease, hypertension, obesity, sleep apnea and chronic kidney disease.  I have not seen her in about a year and a half.  She presents today with her daughter.  She is in a wheelchair due to gait instability and frequent falling.  She has been doing well over the past year without any major health issues.  It looks like back in 08/2021, we switched her carvedilol to metoprolol because she was having some palpitations.  Unfortunately, carvedilol controlled her blood pressure a little bit better.  She is quite high today.  She does measure it at home and it has been elevated as well.  We talked about blood pressure management today and different options including going back to carvedilol since it seemed to control her blood pressure a little bit better.    PHYSICAL EXAMINATION:    VITAL SIGNS:  Today, her blood pressure was measured at 173/83, pulse of 57.  She was unable to give her weight today.  CARDIOVASCULAR:  Tones were regular suggesting a normal sinus rhythm.    RESPIRATORY:  Lung fields are clear.    EXTREMITIES:  She does have mild peripheral edema.    IMPRESSION/PLAN:  In summary, Ms. Barrios is a  pleasant 88-year-old female with:    1.  Nonobstructive coronary disease.  She is asymptomatic with this and is on a baby aspirin and Pravachol  2.  Hypertension, uncontrolled.  She did better on carvedilol with her blood pressure.  We talked about switching back to carvedilol, but she runs a risk of having recurrent palpitations.  The metoprolol had been working well for that.  She is willing to try it, so I gave her a prescription for 25 mg of carvedilol to take twice daily.  If she has recurrent palpitations, we will just have to go back to metoprolol and adjust 1 of her other antihypertensive medications.  I will see her back in a month with a repeat blood pressure check.    Please feel free to contact me with any questions you have in regards to her care.    Ann Knapp DO    cc:  Nga Conde MD  [address], [address]    Ann Knapp DO        D: 2023   T: 2023   MT: judah    Name:     YASIR ROBLERO  MRN:      [id number]        Account:      [id number]   :      1935           Service Date: 2023       Document: [id number]      Thank you for allowing me to participate in the care of your patient.      Sincerely,     Ann Knapp DO     [facility]  cc:   HARPREET Ozuna CNP  [id number]  [place] [address]

## 2023-01-12 NOTE — PROGRESS NOTES
HPI and Plan:   See dictation    Today's clinic visit entailed:    31 minutes spent on the date of the encounter doing chart review, history and exam, documentation and further activities per the note  Provider  Link to MDM Help Grid           Orders Placed This Encounter   Procedures     Follow-Up with Cardiology       Orders Placed This Encounter   Medications     losartan (COZAAR) 25 MG tablet     Sig: Take 25 mg by mouth daily     hydrALAZINE (APRESOLINE) 25 MG tablet     Sig: Take 1 tablet (25 mg) by mouth 2 times daily     Dispense:  90 tablet     Refill:  6     carvedilol (COREG) 25 MG tablet     Sig: Take 1 tablet (25 mg) by mouth 2 times daily (with meals)     Dispense:  30 tablet     Refill:  3       Medications Discontinued During This Encounter   Medication Reason     metoprolol tartrate (LOPRESSOR) 100 MG tablet      hydrALAZINE (APRESOLINE) 25 MG tablet          Encounter Diagnoses   Name Primary?     Essential hypertension      Palpitations Yes     PHILLIP (obstructive sleep apnea)      Coronary artery disease involving native coronary artery of native heart without angina pectoris      Morbid obesity, unspecified obesity type (H)      Renal insufficiency      Lymphedema        CURRENT MEDICATIONS:  Current Outpatient Medications   Medication Sig Dispense Refill     acetaminophen (TYLENOL) 500 MG tablet Take 500-1,000 mg by mouth every 6 hours as needed for mild pain       ASPIRIN 81 MG OR TABS 1 TABLET DAILY, takes AFTERNOON       B-12 500 MCG OR TABS Take 500 mcg by mouth every 48 hours       carvedilol (COREG) 25 MG tablet Take 1 tablet (25 mg) by mouth 2 times daily (with meals) 30 tablet 3     CENTRUM SILVER OR Take 1 tablet by mouth daily       Cranberry 300 MG TABS Take 1 tablet by mouth daily       furosemide (LASIX) 40 MG tablet Take 1 tablet (40 mg) by mouth 2 times daily (Patient taking differently: Take 40 mg by mouth 2 times daily 2 tablets in the morning, 1 tablet in the evening) 180 tablet  "4     hydrALAZINE (APRESOLINE) 25 MG tablet Take 1 tablet (25 mg) by mouth 2 times daily 90 tablet 6     losartan (COZAAR) 25 MG tablet Take 25 mg by mouth daily       nitroGLYcerin (NITROSTAT) 0.4 MG sublingual tablet Place 1 tablet (0.4 mg) under the tongue every 5 minutes as needed for chest pain 25 tablet 1     pravastatin (PRAVACHOL) 20 MG tablet Take 1 tab by mouth daily in the evening 90 tablet 1     QUEtiapine (SEROQUEL) 25 MG tablet Take 12.5 mg by mouth At Bedtime (Patient not taking: Reported on 1/12/2023)         ALLERGIES     Allergies   Allergen Reactions     Neurontin [Gabapentin] Other (See Comments)     hallucinations     Aspirin      In large doses     Azithromycin Other (See Comments)     Chest pain     Cipro [Ciprofloxacin]      Chest pain     Irbesartan Unknown     Morphine Visual Disturbance     Pt. Stated \"i do not like  Morphine. It makes me feel goofy.\"     Pcn [Penicillins]      rash     Prinivil [Lisinopril]      cough     Sulfa Drugs Other (See Comments)     shakes  shakes       PAST MEDICAL HISTORY:  Past Medical History:   Diagnosis Date     CAD (coronary artery disease)     11/17/2011 Lexiscan Nuclear study - mild anterior/apical ischemia (LAD), 11/2011 CT cor angio - nonobstructive CAD, Calcium score 0     Cancer (H)     Basal cell face     CKD (chronic kidney disease)      Frequent UTI      Hyperlipidemia      Hypertension      Neuropathy     secondary to low back DJD     Obese      PHILLIP (obstructive sleep apnea)      Osteopenia      Pyelonephritis      Thrombocytopenia (H)     ITP       PAST SURGICAL HISTORY:  Past Surgical History:   Procedure Laterality Date     APPENDECTOMY       CATARACT IOL, RT/LT  2005     CHOLECYSTECTOMY       ENT SURGERY      basal cell     ESOPHAGOSCOPY, GASTROSCOPY, DUODENOSCOPY (EGD), COMBINED N/A 10/6/2014    Procedure: COMBINED ESOPHAGOSCOPY, GASTROSCOPY, DUODENOSCOPY (EGD);  Surgeon: Rell Torres MD;  Location:  GI     GYN SURGERY      tubal " "ligation, hysterectomy     ORTHOPEDIC SURGERY      jaw surgery, spine stenosis - decompression L 3-5 surgery       FAMILY HISTORY:  Family History   Problem Relation Age of Onset     Breast Cancer Daughter      Heart Disease Sister      BABAR.A.D. Maternal Grandfather      Cancer - colorectal Brother        SOCIAL HISTORY:  Social History     Socioeconomic History     Marital status:    Tobacco Use     Smoking status: Former     Types: Cigarettes     Quit date: 1970     Years since quittin.9     Smokeless tobacco: Never   Substance and Sexual Activity     Alcohol use: Not Currently     Comment: once per month if that     Sexual activity: Never   Other Topics Concern     Parent/sibling w/ CABG, MI or angioplasty before 65F 55M? No     Caffeine Concern No     Sleep Concern Yes     Comment: sleep apnea, wears cpap at night     Stress Concern No     Weight Concern No     Special Diet Yes     Comment: low sodium     Exercise No     Seat Belt Yes   Social History Narrative    She lives in a condo currently in [place]               Review of Systems:  Skin:          Eyes:         ENT:         Respiratory:  Positive for sleep apnea;CPAP;dyspnea on exertion     Cardiovascular:    lightheadedness;Positive for;edema;dizziness Positional lightheadedness/dizziness  Gastroenterology:        Genitourinary:         Musculoskeletal:         Neurologic:         Psychiatric:         Heme/Lymph/Imm:         Endocrine:           Physical Exam:  Vitals: BP (!) 173/83 (BP Location: Right arm, Patient Position: Sitting, Cuff Size: Adult Regular)   Pulse 57   Ht 1.651 m (5' 5\")   SpO2 95%   BMI 41.60 kg/m      Constitutional:  cooperative;in no acute distress morbidly obese      Skin:  warm and dry to the touch          Head:  normocephalic        Eyes:           Lymph:      ENT:  no pallor or cyanosis        Neck:  no carotid bruit        Respiratory:  clear to auscultation;normal symmetry         Cardiac: regular " rhythm     no presence of murmur            pulses below the femoral arteries are diminished                                      GI:  abdomen soft obese      Extremities and Muscular Skeletal:      bilateral LE edema;1+          Neurological:  affect appropriate   In wheelchair    Psych:  Alert and Oriented x 3        CC  June Santana, HARPREET CNP  [address]  [place] [id number]

## 2023-01-17 NOTE — TELEPHONE ENCOUNTER
"Patient states that she started Carvedilol as ordered.  Patient reports that shortly after starting it she awoke in the middle of night and states she was having SOB and light chest pain.  BP at that time was 169/88, patient also reports her heart was beating \"really hard.\"  Patient states this made her nervous so she stopped using the Carvedilol and went back to taking Metoprolol.  Patient states her blood pressure has gone down but remains 140's-150's systolic and states feeling of heart pounding is better.        Per last OV note:    2.  Hypertension, uncontrolled.  She did better on carvedilol with her blood pressure.  We talked about switching back to carvedilol, but she runs a risk of having recurrent palpitations.  The metoprolol had been working well for that.  She is willing to try it, so I gave her a prescription for 25 mg of carvedilol to take twice daily.  If she has recurrent palpitations, we will just have to go back to metoprolol and adjust 1 of her other antihypertensive medications.  I will see her back in a month with a repeat blood pressure check.    RN advised patient per OV notes RN advised patient to continue with Metoprolol at previous dosing and stop Carvedilol.  Patient verbalized understanding   RN will route to Dr. Knapp to further advise on management of hypertension.    Sara Esposito RN on 1/17/2023 at 3:15 PM    "

## 2023-01-17 NOTE — TELEPHONE ENCOUNTER
[facility] Call Center    Phone Message    May a detailed message be left on voicemail: yes     Reason for Call: Medication Question or concern regarding medication   Prescription Clarification  Name of Medication: carvedilol  Prescribing Provider: Aria   Pharmacy: [id number] - [facility] [address]     What on the order needs clarification? Pt is requesting to switch back to metoprolol.  Per pt's daughter, pt was having palpitations after taking carvedilol, so she wants to switch back.  Please send order to pharmacy.  Call pt with any question.           Action Taken: Other: cardio    Travel Screening: Not Applicable

## 2023-01-25 NOTE — TELEPHONE ENCOUNTER
Ann Knapp DO Lidke, Jen M RN  Caller: Unspecified (1 week ago)  No, continue  metoprolol but increase losartan      Message left for patient to return call to clinic  Sara Esposito, JONY on 1/25/2023 at 4:22 PM

## 2023-01-26 NOTE — TELEPHONE ENCOUNTER
Spoke with patient and daughter Haven who sets up patient medications.  Both have verbalized understanding and agreement with plan.  Patient will continue to monitor BP and report to RN if SBP consistently greater than 150.  Sara Esposito RN on 1/26/2023 at 11:30 AM     English

## 2023-05-04 NOTE — PROGRESS NOTES
Service Date: 05/04/2023    TELEPHONE VISIT    REFERRING PHYSICIAN:  Dr. Nga Conde    HISTORY OF PRESENT ILLNESS:  Ms. Barrios is a pleasant 88-year-old female with a history of nonobstructive coronary artery disease, hypertension, obesity, sleep apnea and chronic kidney disease.  She has been having difficulty with elevated blood pressures.  I saw her back in January and had suggested switching her to carvedilol.  She was set up for a phone visit today.  Unfortunately, she is usually accompanied by daughter who provides some meaningful history.  Ms. Barrios does not set up her own medications, so she is vaguely aware of what she is taking and when.  She does report that her blood pressures are running in the 140s to 150s and she was concerned about them still being elevated.  She feels okay.  She does not have any chest pain or shortness of breath.  She was able to report her blood pressure today at 142/78 and a heart rate of 66 but again, I am not clear on what her medications are.  I did make some attempts to get her daughter's phone number today but her daughter is unavailable.  I will try and contact her tomorrow morning, as Ms. Barrios states she will likely be available at that time, so we can discuss her medications.  I suspect we can divide the doses and in particular her losartan to provide more coverage throughout the day and the evening hours.  If needed, we could always increase the hydralazine dose but I will need to know exactly what she is taking and when in order to make any recommendations.    SUMMARY:  Ms. Barrios seems to be doing well from a cardiac perspective.  She is asymptomatic.  Her blood pressures are mildly elevated but I am not inclined to increasing her medications based on these numbers given her age and risk of side effect and falling from low blood pressures.  I will work with her daughter to determine exactly what she is taking and when and see if there are options to divide the dose of her  medications or change the timing.  I will be happy to continue to follow up with her as needed.    Please feel free to contact me with any questions you have in regards to her care.    Ann Knapp DO    cc:  [place] [address]    Ann Knapp DO        D: 2023   T: 2023   MT: romero    Name:     YASIR ROBLERO  MRN:      [id number]        Account:      [id number]   :      1935           Service Date: 2023       Document: [id number]

## 2023-05-04 NOTE — PROGRESS NOTES
"Karli is a 88 year old who is being evaluated via a billable telephone visit.      What phone number would you like to be contacted at? [phone]  How would you like to obtain your AVS? Mail a copy    Distant Location (provider location):  On-site  Phone call duration:25minutes    Review Of Systems  Respiratory: Positive sleep apnea, CPAP, Dyspnea on exertion  Cardiovascular: Positive for positional lightheadedness/dizziness but improving, Edema, Head is \"noisy\" when BP is high    Patient reported vitals:  BP: 142/78  Heart rate: 66  Weight: N/A    "

## 2023-05-04 NOTE — LETTER
"5/4/2023    Nga Conde MD, MD  [facility] [address]  [facility] [address]    RE: Karli Barrios       Dear Colleague,     I had the pleasure of seeing Karli Barrios in the [facility].  Karli is a 88 year old who is being evaluated via a billable telephone visit.      What phone number would you like to be contacted at? [phone]  How would you like to obtain your AVS? Mail a copy    Distant Location (provider location):  On-site  Phone call duration:25minutes    Review Of Systems  Respiratory: Positive sleep apnea, CPAP, Dyspnea on exertion  Cardiovascular: Positive for positional lightheadedness/dizziness but improving, Edema, Head is \"noisy\" when BP is high    Patient reported vitals:  BP: 142/78  Heart rate: 66  Weight: N/A        Thank you for allowing me to participate in the care of your patient.      Sincerely,     Ann Knapp DO     [facility]  cc:   Ann Knapp DO  [address]  [place] [address]        "

## 2023-05-05 NOTE — TELEPHONE ENCOUNTER
[facility] Call Center    Phone Message    May a detailed message be left on voicemail: yes     Reason for Call: Other:    Patient daughter Haven called. Patient told Haven that she needed to call Dr. Knapp about medications. Haven works from 12pm to 6pm. Please call Haven back to discuss.     Action Taken: Other: Cardiology     Travel Screening: Not Applicable     Thank you!  [facility]

## 2023-05-05 NOTE — TELEPHONE ENCOUNTER
Returned call, no answer, message left to return call to clinic  Sara Esposito RN on 5/5/2023 at 10:43 AM